# Patient Record
Sex: MALE | Race: WHITE | NOT HISPANIC OR LATINO
[De-identification: names, ages, dates, MRNs, and addresses within clinical notes are randomized per-mention and may not be internally consistent; named-entity substitution may affect disease eponyms.]

---

## 2017-01-09 ENCOUNTER — APPOINTMENT (OUTPATIENT)
Dept: OTOLARYNGOLOGY | Facility: AMBULATORY SURGERY CENTER | Age: 32
End: 2017-01-09

## 2017-02-21 ENCOUNTER — APPOINTMENT (OUTPATIENT)
Dept: OTOLARYNGOLOGY | Facility: CLINIC | Age: 32
End: 2017-02-21

## 2017-02-21 VITALS
WEIGHT: 220 LBS | TEMPERATURE: 98.1 F | SYSTOLIC BLOOD PRESSURE: 137 MMHG | HEIGHT: 74 IN | HEART RATE: 69 BPM | BODY MASS INDEX: 28.23 KG/M2 | DIASTOLIC BLOOD PRESSURE: 89 MMHG

## 2017-02-21 RX ORDER — AMOXICILLIN AND CLAVULANATE POTASSIUM 875; 125 MG/1; MG/1
875-125 TABLET, COATED ORAL
Qty: 28 | Refills: 1 | Status: ACTIVE | COMMUNITY
Start: 2017-02-21 | End: 1900-01-01

## 2017-02-21 RX ORDER — PREDNISONE 10 MG/1
10 TABLET ORAL
Qty: 28 | Refills: 1 | Status: ACTIVE | COMMUNITY
Start: 2017-02-21 | End: 1900-01-01

## 2017-02-21 RX ORDER — HYDROCODONE BITARTRATE AND ACETAMINOPHEN 5; 325 MG/1; MG/1
5-325 TABLET ORAL
Qty: 5 | Refills: 0 | Status: ACTIVE | COMMUNITY
Start: 2017-02-21 | End: 1900-01-01

## 2017-02-22 ENCOUNTER — FORM ENCOUNTER (OUTPATIENT)
Age: 32
End: 2017-02-22

## 2017-02-23 ENCOUNTER — OUTPATIENT (OUTPATIENT)
Dept: OUTPATIENT SERVICES | Facility: HOSPITAL | Age: 32
LOS: 1 days | End: 2017-02-23
Payer: COMMERCIAL

## 2017-02-23 PROCEDURE — 70486 CT MAXILLOFACIAL W/O DYE: CPT

## 2017-02-23 PROCEDURE — 70486 CT MAXILLOFACIAL W/O DYE: CPT | Mod: 26

## 2017-03-05 ENCOUNTER — RESULT REVIEW (OUTPATIENT)
Age: 32
End: 2017-03-05

## 2017-03-06 ENCOUNTER — APPOINTMENT (OUTPATIENT)
Dept: OTOLARYNGOLOGY | Facility: AMBULATORY SURGERY CENTER | Age: 32
End: 2017-03-06

## 2017-03-06 ENCOUNTER — OUTPATIENT (OUTPATIENT)
Dept: OUTPATIENT SERVICES | Facility: HOSPITAL | Age: 32
LOS: 1 days | Discharge: HOME | End: 2017-03-06
Payer: COMMERCIAL

## 2017-03-06 PROCEDURE — 61782 SCAN PROC CRANIAL EXTRA: CPT

## 2017-03-06 PROCEDURE — 31254 NSL/SINS NDSC W/PRTL ETHMDCT: CPT | Mod: RT

## 2017-03-06 PROCEDURE — 61600 RESECT/EXCISE CRANIAL LESION: CPT | Mod: 52

## 2017-03-08 LAB — SURGICAL PATHOLOGY STUDY: SIGNIFICANT CHANGE UP

## 2017-03-09 DIAGNOSIS — I10 ESSENTIAL (PRIMARY) HYPERTENSION: ICD-10-CM

## 2017-03-09 DIAGNOSIS — J45.909 UNSPECIFIED ASTHMA, UNCOMPLICATED: ICD-10-CM

## 2017-03-09 DIAGNOSIS — J34.89 OTHER SPECIFIED DISORDERS OF NOSE AND NASAL SINUSES: ICD-10-CM

## 2017-03-09 DIAGNOSIS — E78.5 HYPERLIPIDEMIA, UNSPECIFIED: ICD-10-CM

## 2017-03-09 DIAGNOSIS — J32.1 CHRONIC FRONTAL SINUSITIS: ICD-10-CM

## 2017-03-09 DIAGNOSIS — J32.2 CHRONIC ETHMOIDAL SINUSITIS: ICD-10-CM

## 2017-03-09 DIAGNOSIS — Z79.52 LONG TERM (CURRENT) USE OF SYSTEMIC STEROIDS: ICD-10-CM

## 2017-03-29 ENCOUNTER — APPOINTMENT (OUTPATIENT)
Dept: OTOLARYNGOLOGY | Facility: CLINIC | Age: 32
End: 2017-03-29

## 2017-03-29 VITALS
BODY MASS INDEX: 28.23 KG/M2 | DIASTOLIC BLOOD PRESSURE: 88 MMHG | TEMPERATURE: 98.2 F | SYSTOLIC BLOOD PRESSURE: 129 MMHG | HEIGHT: 74 IN | WEIGHT: 220 LBS | HEART RATE: 74 BPM

## 2017-03-29 RX ORDER — FLUTICASONE PROPIONATE 50 UG/1
50 SPRAY, METERED NASAL
Qty: 6 | Refills: 1 | Status: ACTIVE | COMMUNITY
Start: 2017-03-29 | End: 1900-01-01

## 2017-05-02 ENCOUNTER — APPOINTMENT (OUTPATIENT)
Dept: OTOLARYNGOLOGY | Facility: CLINIC | Age: 32
End: 2017-05-02

## 2017-05-02 VITALS
WEIGHT: 220 LBS | DIASTOLIC BLOOD PRESSURE: 84 MMHG | HEIGHT: 74 IN | SYSTOLIC BLOOD PRESSURE: 124 MMHG | BODY MASS INDEX: 28.23 KG/M2 | HEART RATE: 88 BPM

## 2017-05-02 RX ORDER — AZELASTINE HYDROCHLORIDE 137 UG/1
0.1 SPRAY, METERED NASAL
Qty: 30 | Refills: 0 | Status: ACTIVE | COMMUNITY
Start: 2016-12-23

## 2017-08-15 ENCOUNTER — APPOINTMENT (OUTPATIENT)
Dept: OTOLARYNGOLOGY | Facility: CLINIC | Age: 32
End: 2017-08-15
Payer: COMMERCIAL

## 2017-08-15 VITALS
WEIGHT: 220 LBS | SYSTOLIC BLOOD PRESSURE: 136 MMHG | TEMPERATURE: 98.7 F | BODY MASS INDEX: 28.23 KG/M2 | HEART RATE: 83 BPM | HEIGHT: 74 IN | DIASTOLIC BLOOD PRESSURE: 76 MMHG

## 2017-08-15 PROCEDURE — 99214 OFFICE O/P EST MOD 30 MIN: CPT | Mod: 25

## 2017-08-15 PROCEDURE — 31231 NASAL ENDOSCOPY DX: CPT

## 2017-08-15 RX ORDER — LEVOFLOXACIN 500 MG/1
500 TABLET, FILM COATED ORAL
Qty: 10 | Refills: 0 | Status: ACTIVE | COMMUNITY
Start: 2016-12-20

## 2017-08-15 RX ORDER — SERTRALINE HYDROCHLORIDE 100 MG/1
100 TABLET, FILM COATED ORAL
Qty: 45 | Refills: 0 | Status: ACTIVE | COMMUNITY
Start: 2016-08-30

## 2017-11-21 ENCOUNTER — APPOINTMENT (OUTPATIENT)
Dept: OTOLARYNGOLOGY | Facility: CLINIC | Age: 32
End: 2017-11-21
Payer: COMMERCIAL

## 2017-11-21 VITALS — HEIGHT: 76 IN | WEIGHT: 220 LBS | BODY MASS INDEX: 26.79 KG/M2

## 2017-11-21 PROCEDURE — 31231 NASAL ENDOSCOPY DX: CPT

## 2017-11-21 PROCEDURE — 99214 OFFICE O/P EST MOD 30 MIN: CPT | Mod: 25

## 2018-03-28 ENCOUNTER — APPOINTMENT (OUTPATIENT)
Dept: OTOLARYNGOLOGY | Facility: CLINIC | Age: 33
End: 2018-03-28
Payer: COMMERCIAL

## 2018-03-28 VITALS
WEIGHT: 225 LBS | HEIGHT: 76 IN | OXYGEN SATURATION: 99 % | BODY MASS INDEX: 27.4 KG/M2 | DIASTOLIC BLOOD PRESSURE: 80 MMHG | TEMPERATURE: 98.6 F | HEART RATE: 85 BPM | SYSTOLIC BLOOD PRESSURE: 138 MMHG

## 2018-03-28 PROCEDURE — 31231 NASAL ENDOSCOPY DX: CPT

## 2018-03-28 PROCEDURE — 99214 OFFICE O/P EST MOD 30 MIN: CPT | Mod: 25

## 2018-03-28 RX ORDER — FLUTICASONE PROPIONATE 50 UG/1
50 SPRAY, METERED NASAL
Qty: 6 | Refills: 1 | Status: ACTIVE | COMMUNITY
Start: 2018-03-28 | End: 1900-01-01

## 2018-06-24 ENCOUNTER — RX RENEWAL (OUTPATIENT)
Age: 33
End: 2018-06-24

## 2018-07-11 ENCOUNTER — APPOINTMENT (OUTPATIENT)
Dept: ENDOCRINOLOGY | Facility: CLINIC | Age: 33
End: 2018-07-11
Payer: COMMERCIAL

## 2018-07-11 VITALS
DIASTOLIC BLOOD PRESSURE: 86 MMHG | HEIGHT: 74 IN | WEIGHT: 205 LBS | BODY MASS INDEX: 26.31 KG/M2 | SYSTOLIC BLOOD PRESSURE: 126 MMHG | HEART RATE: 82 BPM

## 2018-07-11 PROCEDURE — 99204 OFFICE O/P NEW MOD 45 MIN: CPT

## 2018-07-11 RX ORDER — VERAPAMIL HYDROCHLORIDE 120 MG/1
120 TABLET ORAL
Qty: 30 | Refills: 0 | Status: ACTIVE | COMMUNITY
Start: 2018-02-13

## 2018-07-11 RX ORDER — CHORIONIC GONADOTROPIN 10000 UNIT
10000 KIT INTRAMUSCULAR
Refills: 0 | Status: ACTIVE | COMMUNITY
Start: 2018-07-11

## 2018-07-19 ENCOUNTER — FORM ENCOUNTER (OUTPATIENT)
Age: 33
End: 2018-07-19

## 2018-07-20 ENCOUNTER — APPOINTMENT (OUTPATIENT)
Dept: MRI IMAGING | Facility: CLINIC | Age: 33
End: 2018-07-20
Payer: COMMERCIAL

## 2018-07-20 ENCOUNTER — OUTPATIENT (OUTPATIENT)
Dept: OUTPATIENT SERVICES | Facility: HOSPITAL | Age: 33
LOS: 1 days | End: 2018-07-20

## 2018-07-20 LAB
FSH SERPL-MCNC: 3.1 IU/L
LH SERPL-ACNC: 1.9 IU/L
PROLACTIN SERPL-MCNC: 9.1 NG/ML
SHBG SERPL-SCNC: 22 NMOL/L
TESTOST BND SERPL-MCNC: 8 PG/ML
TESTOST SERPL-MCNC: 670 NG/DL

## 2018-07-20 PROCEDURE — 70553 MRI BRAIN STEM W/O & W/DYE: CPT | Mod: 26

## 2018-07-31 ENCOUNTER — RX RENEWAL (OUTPATIENT)
Age: 33
End: 2018-07-31

## 2018-07-31 ENCOUNTER — CHART COPY (OUTPATIENT)
Age: 33
End: 2018-07-31

## 2018-08-09 ENCOUNTER — RX RENEWAL (OUTPATIENT)
Age: 33
End: 2018-08-09

## 2018-09-29 ENCOUNTER — RX RENEWAL (OUTPATIENT)
Age: 33
End: 2018-09-29

## 2018-10-09 ENCOUNTER — RX RENEWAL (OUTPATIENT)
Age: 33
End: 2018-10-09

## 2018-10-16 ENCOUNTER — APPOINTMENT (OUTPATIENT)
Dept: OTOLARYNGOLOGY | Facility: CLINIC | Age: 33
End: 2018-10-16
Payer: COMMERCIAL

## 2018-10-16 VITALS
DIASTOLIC BLOOD PRESSURE: 87 MMHG | OXYGEN SATURATION: 97 % | TEMPERATURE: 98.6 F | WEIGHT: 205 LBS | HEIGHT: 74 IN | HEART RATE: 77 BPM | SYSTOLIC BLOOD PRESSURE: 136 MMHG | BODY MASS INDEX: 26.31 KG/M2

## 2018-10-16 PROCEDURE — 99215 OFFICE O/P EST HI 40 MIN: CPT | Mod: 25

## 2018-10-16 PROCEDURE — 31231 NASAL ENDOSCOPY DX: CPT

## 2018-10-16 RX ORDER — PREDNISONE 10 MG/1
10 TABLET ORAL
Qty: 40 | Refills: 1 | Status: ACTIVE | COMMUNITY
Start: 2018-10-16 | End: 1900-01-01

## 2018-10-16 RX ORDER — AMOXICILLIN AND CLAVULANATE POTASSIUM 875; 125 MG/1; MG/1
875-125 TABLET, COATED ORAL TWICE DAILY
Qty: 42 | Refills: 1 | Status: ACTIVE | COMMUNITY
Start: 2018-10-16 | End: 1900-01-01

## 2018-11-01 ENCOUNTER — RX RENEWAL (OUTPATIENT)
Age: 33
End: 2018-11-01

## 2018-11-01 LAB
FSH SERPL-MCNC: 3.9 IU/L
LH SERPL-ACNC: 5.9 IU/L
PROLACTIN SERPL-MCNC: 8.6 NG/ML
TESTOST BND SERPL-MCNC: 9.6 PG/ML
TESTOST SERPL-MCNC: 421.5 NG/DL

## 2018-11-12 ENCOUNTER — APPOINTMENT (OUTPATIENT)
Dept: ENDOCRINOLOGY | Facility: CLINIC | Age: 33
End: 2018-11-12
Payer: COMMERCIAL

## 2018-11-12 VITALS
SYSTOLIC BLOOD PRESSURE: 154 MMHG | WEIGHT: 200 LBS | DIASTOLIC BLOOD PRESSURE: 90 MMHG | HEIGHT: 74 IN | BODY MASS INDEX: 25.67 KG/M2 | HEART RATE: 91 BPM

## 2018-11-12 DIAGNOSIS — J32.2 CHRONIC ETHMOIDAL SINUSITIS: ICD-10-CM

## 2018-11-12 DIAGNOSIS — H92.20 OTORRHAGIA, UNSPECIFIED EAR: ICD-10-CM

## 2018-11-12 DIAGNOSIS — R04.0 EPISTAXIS: ICD-10-CM

## 2018-11-12 PROCEDURE — 99214 OFFICE O/P EST MOD 30 MIN: CPT

## 2018-12-04 ENCOUNTER — FORM ENCOUNTER (OUTPATIENT)
Age: 33
End: 2018-12-04

## 2018-12-05 ENCOUNTER — APPOINTMENT (OUTPATIENT)
Dept: CT IMAGING | Facility: CLINIC | Age: 33
End: 2018-12-05
Payer: COMMERCIAL

## 2018-12-05 ENCOUNTER — OUTPATIENT (OUTPATIENT)
Dept: OUTPATIENT SERVICES | Facility: HOSPITAL | Age: 33
LOS: 1 days | End: 2018-12-05
Payer: COMMERCIAL

## 2018-12-05 PROCEDURE — 70486 CT MAXILLOFACIAL W/O DYE: CPT | Mod: 26

## 2018-12-05 PROCEDURE — 70486 CT MAXILLOFACIAL W/O DYE: CPT

## 2018-12-11 ENCOUNTER — APPOINTMENT (OUTPATIENT)
Dept: OTOLARYNGOLOGY | Facility: CLINIC | Age: 33
End: 2018-12-11
Payer: COMMERCIAL

## 2018-12-20 ENCOUNTER — APPOINTMENT (OUTPATIENT)
Dept: OTOLARYNGOLOGY | Facility: CLINIC | Age: 33
End: 2018-12-20
Payer: COMMERCIAL

## 2018-12-20 VITALS
SYSTOLIC BLOOD PRESSURE: 146 MMHG | HEIGHT: 74 IN | BODY MASS INDEX: 25.67 KG/M2 | HEART RATE: 81 BPM | DIASTOLIC BLOOD PRESSURE: 89 MMHG | OXYGEN SATURATION: 98 % | WEIGHT: 200 LBS

## 2018-12-20 DIAGNOSIS — J32.0 CHRONIC MAXILLARY SINUSITIS: ICD-10-CM

## 2018-12-20 PROCEDURE — 99214 OFFICE O/P EST MOD 30 MIN: CPT | Mod: 25

## 2018-12-20 PROCEDURE — 31231 NASAL ENDOSCOPY DX: CPT

## 2019-01-05 ENCOUNTER — TRANSCRIPTION ENCOUNTER (OUTPATIENT)
Age: 34
End: 2019-01-05

## 2019-02-13 ENCOUNTER — LABORATORY RESULT (OUTPATIENT)
Age: 34
End: 2019-02-13

## 2019-03-18 ENCOUNTER — OTHER (OUTPATIENT)
Age: 34
End: 2019-03-18

## 2019-04-19 ENCOUNTER — RX RENEWAL (OUTPATIENT)
Age: 34
End: 2019-04-19

## 2019-05-16 ENCOUNTER — APPOINTMENT (OUTPATIENT)
Dept: ENDOCRINOLOGY | Facility: CLINIC | Age: 34
End: 2019-05-16
Payer: COMMERCIAL

## 2019-05-16 VITALS
DIASTOLIC BLOOD PRESSURE: 82 MMHG | SYSTOLIC BLOOD PRESSURE: 136 MMHG | WEIGHT: 199 LBS | BODY MASS INDEX: 25.54 KG/M2 | HEART RATE: 77 BPM | HEIGHT: 74 IN

## 2019-05-16 DIAGNOSIS — L65.9 NONSCARRING HAIR LOSS, UNSPECIFIED: ICD-10-CM

## 2019-05-16 DIAGNOSIS — Z00.00 ENCOUNTER FOR GENERAL ADULT MEDICAL EXAMINATION W/OUT ABNORMAL FINDINGS: ICD-10-CM

## 2019-05-16 PROCEDURE — 99214 OFFICE O/P EST MOD 30 MIN: CPT

## 2019-05-16 NOTE — HISTORY OF PRESENT ILLNESS
[FreeTextEntry1] : 32 y.o. male referred for evaluation of low testosterone found incidentally during an evaluation for fatigue.\par Total T was 149 ng/dl; free T was 6.0 pg/ml. PRL was 16. TFTs were normal. LH and FSH were not obtained.\par The patient reports good libido and good erectile function. He plans to have children at some point in the future.\par 11/12/18. The patient is feeling well. He is on treatment with Clomid. Has lost 5 lb. Testosterone levels improved, but fatigue persists. He is planning to have children in 2-3 yrs.\par Pituitary MRI was normal.\par 5/16/19. The patient is doing well except his fatigue persists. He discontinued Clomid 3 months ago.\par Is planning to get  in 5 months. His weight is stable.

## 2019-05-16 NOTE — PHYSICAL EXAM
[Alert] : alert [Well Nourished] : well nourished [No Acute Distress] : no acute distress [Well Developed] : well developed [Normal Sclera/Conjunctiva] : normal sclera/conjunctiva [EOMI] : extra ocular movement intact [No Proptosis] : no proptosis [Normal Oropharynx] : the oropharynx was normal [No Thyroid Nodules] : there were no palpable thyroid nodules [Thyroid Not Enlarged] : the thyroid was not enlarged [No Respiratory Distress] : no respiratory distress [No Accessory Muscle Use] : no accessory muscle use [Clear to Auscultation] : lungs were clear to auscultation bilaterally [Normal Rate] : heart rate was normal  [Normal S1, S2] : normal S1 and S2 [Pedal Pulses Normal] : the pedal pulses are present [Regular Rhythm] : with a regular rhythm [No Edema] : there was no peripheral edema [Normal Bowel Sounds] : normal bowel sounds [Not Tender] : non-tender [Soft] : abdomen soft [Not Distended] : not distended [Post Cervical Nodes] : posterior cervical nodes [Anterior Cervical Nodes] : anterior cervical nodes [Axillary Nodes] : axillary nodes [No Spinal Tenderness] : no spinal tenderness [Normal] : normal and non tender [Spine Straight] : spine straight [No Stigmata of Cushings Syndrome] : no stigmata of cushings syndrome [Normal Gait] : normal gait [Normal Strength/Tone] : muscle strength and tone were normal [Acanthosis Nigricans] : no acanthosis nigricans [No Rash] : no rash [Normal Reflexes] : deep tendon reflexes were 2+ and symmetric [No Tremors] : no tremors [Oriented x3] : oriented to person, place, and time

## 2019-05-24 LAB
ALBUMIN SERPL ELPH-MCNC: 4.9 G/DL
ALP BLD-CCNC: 70 U/L
ALT SERPL-CCNC: 22 U/L
ANION GAP SERPL CALC-SCNC: 10 MMOL/L
AST SERPL-CCNC: 23 U/L
BASOPHILS # BLD AUTO: 0.02 K/UL
BASOPHILS NFR BLD AUTO: 0.4 %
BILIRUB SERPL-MCNC: 0.7 MG/DL
BUN SERPL-MCNC: 22 MG/DL
CALCIUM SERPL-MCNC: 10.2 MG/DL
CHLORIDE SERPL-SCNC: 100 MMOL/L
CHOLEST SERPL-MCNC: 141 MG/DL
CHOLEST/HDLC SERPL: 3.3 RATIO
CO2 SERPL-SCNC: 31 MMOL/L
CREAT SERPL-MCNC: 1.25 MG/DL
EOSINOPHIL # BLD AUTO: 0.16 K/UL
EOSINOPHIL NFR BLD AUTO: 2.8 %
ESTIMATED AVERAGE GLUCOSE: 97 MG/DL
FSH SERPL-MCNC: 2.7 IU/L
GLUCOSE SERPL-MCNC: 86 MG/DL
HBA1C MFR BLD HPLC: 5 %
HCT VFR BLD CALC: 46 %
HDLC SERPL-MCNC: 43 MG/DL
HGB BLD-MCNC: 15.6 G/DL
IMM GRANULOCYTES NFR BLD AUTO: 0.2 %
LDLC SERPL CALC-MCNC: 73 MG/DL
LH SERPL-ACNC: 2.7 IU/L
LYMPHOCYTES # BLD AUTO: 1.84 K/UL
LYMPHOCYTES NFR BLD AUTO: 32.5 %
MAN DIFF?: NORMAL
MCHC RBC-ENTMCNC: 32.2 PG
MCHC RBC-ENTMCNC: 33.9 GM/DL
MCV RBC AUTO: 95 FL
MONOCYTES # BLD AUTO: 0.54 K/UL
MONOCYTES NFR BLD AUTO: 9.5 %
NEUTROPHILS # BLD AUTO: 3.1 K/UL
NEUTROPHILS NFR BLD AUTO: 54.6 %
PLATELET # BLD AUTO: 236 K/UL
POTASSIUM SERPL-SCNC: 4.9 MMOL/L
PROLACTIN SERPL-MCNC: 9.4 NG/ML
PROT SERPL-MCNC: 7.2 G/DL
RBC # BLD: 4.84 M/UL
RBC # FLD: 12.7 %
SODIUM SERPL-SCNC: 141 MMOL/L
T3 SERPL-MCNC: 81 NG/DL
T4 FREE SERPL-MCNC: 1.5 NG/DL
TESTOST BND SERPL-MCNC: 6.7 PG/ML
TESTOST SERPL-MCNC: 262.1 NG/DL
TRIGL SERPL-MCNC: 123 MG/DL
TSH SERPL-ACNC: 1.31 UIU/ML
WBC # FLD AUTO: 5.67 K/UL

## 2021-06-28 ENCOUNTER — APPOINTMENT (OUTPATIENT)
Dept: ENDOCRINOLOGY | Facility: CLINIC | Age: 36
End: 2021-06-28
Payer: COMMERCIAL

## 2021-06-28 VITALS
DIASTOLIC BLOOD PRESSURE: 97 MMHG | BODY MASS INDEX: 25.94 KG/M2 | SYSTOLIC BLOOD PRESSURE: 150 MMHG | WEIGHT: 202 LBS | HEART RATE: 94 BPM

## 2021-06-28 PROCEDURE — 99072 ADDL SUPL MATRL&STAF TM PHE: CPT

## 2021-06-28 PROCEDURE — 99214 OFFICE O/P EST MOD 30 MIN: CPT

## 2021-06-28 RX ORDER — CHORIONIC GONADOTROPIN 10000 UNIT
10000 KIT INTRAMUSCULAR
Refills: 0 | Status: ACTIVE | COMMUNITY
Start: 2021-06-28

## 2021-06-28 NOTE — ASSESSMENT
[FreeTextEntry1] : Hypogonadism.\par Low GH level.\par Will repeat GH and obtain IGF1 level.\par F/u once the above results are received.

## 2021-06-28 NOTE — HISTORY OF PRESENT ILLNESS
[FreeTextEntry1] : 32 y.o. male referred for evaluation of low testosterone found incidentally during an evaluation for fatigue.\par Total T was 149 ng/dl; free T was 6.0 pg/ml. PRL was 16. TFTs were normal. LH and FSH were not obtained.\par The patient reports good libido and good erectile function. He plans to have children at some point in the future.\par 11/12/18. The patient is feeling well. He is on treatment with Clomid. Has lost 5 lb. Testosterone levels improved, but fatigue persists. He is planning to have children in 2-3 yrs.\par Pituitary MRI was normal.\par 5/16/19. The patient is doing well except his fatigue persists. He discontinued Clomid 3 months ago.\par Is planning to get  in 5 months. His weight is stable.\par 6/28/21. The patient is doing well overall except for persisting fatigue.\par He had a baby born 5 weeks ago.\par PMD obtained a GH level which was undetectable.\par MRI of the pituitary was normal in 7/20/2018.\par He is wondering if GH tx is indicated.

## 2021-06-30 LAB
GH SERPL-MCNC: <0.03 NG/ML
IGF-1 INTERP: NORMAL
IGF-I BLD-MCNC: 247 NG/ML

## 2021-12-14 ENCOUNTER — APPOINTMENT (OUTPATIENT)
Dept: ENDOCRINOLOGY | Facility: CLINIC | Age: 36
End: 2021-12-14
Payer: COMMERCIAL

## 2021-12-14 PROCEDURE — 99214 OFFICE O/P EST MOD 30 MIN: CPT | Mod: 95

## 2021-12-14 NOTE — ASSESSMENT
[FreeTextEntry1] : Hypogonadism.\par \par Will repeat lab. tests.\par Will provide information re HCG injections.\par F/U once the results of the above tests are available.

## 2021-12-14 NOTE — HISTORY OF PRESENT ILLNESS
[FreeTextEntry1] : 32 y.o. male referred for evaluation of low testosterone found incidentally during an evaluation for fatigue.\par Total T was 149 ng/dl; free T was 6.0 pg/ml. PRL was 16. TFTs were normal. LH and FSH were not obtained.\par The patient reports good libido and good erectile function. He plans to have children at some point in the future.\par 11/12/18. The patient is feeling well. He is on treatment with Clomid. Has lost 5 lb. Testosterone levels improved, but fatigue persists. He is planning to have children in 2-3 yrs.\par Pituitary MRI was normal.\par 5/16/19. The patient is doing well except his fatigue persists. He discontinued Clomid 3 months ago.\par Is planning to get  in 5 months. His weight is stable.\par 6/28/21. The patient is doing well overall except for persisting fatigue.\par He had a baby born 5 weeks ago.\par PMD obtained a GH level which was undetectable.\par MRI of the pituitary was normal in 7/20/2018.\par He is wondering if GH tx is indicated.\par 12/14/21. The patient's condition is unchanged.\par He continues to c/o fatigue and headaches but otherwise feels well.\par He has a 7 months old baby and is definitely planning more children.

## 2021-12-14 NOTE — REVIEW OF SYSTEMS
[As Noted in HPI] : as noted in HPI [Fatigue] : fatigue [Negative] : Heme/Lymph [FreeTextEntry2] : headaches

## 2021-12-14 NOTE — REASON FOR VISIT
[Home] : at home, [unfilled] , at the time of the visit. [Medical Office: ([facility])___] : at the medical office located in  [Verbal consent obtained from patient] : the patient, [unfilled] [Follow - Up] : a follow-up visit [Hypogonadism] : hypogonadism

## 2021-12-15 RX ORDER — CHORIONIC GONADOTROPIN 10000 UNIT
10000 KIT INTRAMUSCULAR
Refills: 0 | Status: ACTIVE | COMMUNITY
Start: 2021-12-15

## 2022-01-06 ENCOUNTER — NON-APPOINTMENT (OUTPATIENT)
Age: 37
End: 2022-01-06

## 2022-01-06 LAB
25(OH)D3 SERPL-MCNC: 33.8 NG/ML
ALBUMIN SERPL ELPH-MCNC: 5.2 G/DL
ALP BLD-CCNC: 80 U/L
ALT SERPL-CCNC: 38 U/L
ANION GAP SERPL CALC-SCNC: 14 MMOL/L
AST SERPL-CCNC: 26 U/L
BASOPHILS # BLD AUTO: 0.01 K/UL
BASOPHILS NFR BLD AUTO: 0.1 %
BILIRUB SERPL-MCNC: 0.6 MG/DL
BUN SERPL-MCNC: 16 MG/DL
CALCIUM SERPL-MCNC: 10.2 MG/DL
CHLORIDE SERPL-SCNC: 99 MMOL/L
CHOLEST SERPL-MCNC: 190 MG/DL
CO2 SERPL-SCNC: 27 MMOL/L
CREAT SERPL-MCNC: 1.17 MG/DL
EOSINOPHIL # BLD AUTO: 0 K/UL
EOSINOPHIL NFR BLD AUTO: 0 %
ESTIMATED AVERAGE GLUCOSE: 97 MG/DL
FSH SERPL-MCNC: 4.9 IU/L
GH SERPL-MCNC: 0.21 NG/ML
GLUCOSE SERPL-MCNC: 93 MG/DL
HBA1C MFR BLD HPLC: 5 %
HCT VFR BLD CALC: 48.4 %
HDLC SERPL-MCNC: 55 MG/DL
HGB BLD-MCNC: 16.4 G/DL
IGF-1 INTERP: NORMAL
IGF-I BLD-MCNC: 256 NG/ML
IMM GRANULOCYTES NFR BLD AUTO: 0.3 %
LDLC SERPL CALC-MCNC: 118 MG/DL
LH SERPL-ACNC: 10.8 IU/L
LYMPHOCYTES # BLD AUTO: 1.52 K/UL
LYMPHOCYTES NFR BLD AUTO: 12.9 %
MAN DIFF?: NORMAL
MCHC RBC-ENTMCNC: 32.6 PG
MCHC RBC-ENTMCNC: 33.9 GM/DL
MCV RBC AUTO: 96.2 FL
MONOCYTES # BLD AUTO: 0.92 K/UL
MONOCYTES NFR BLD AUTO: 7.8 %
NEUTROPHILS # BLD AUTO: 9.27 K/UL
NEUTROPHILS NFR BLD AUTO: 78.9 %
NONHDLC SERPL-MCNC: 135 MG/DL
PLATELET # BLD AUTO: 261 K/UL
POTASSIUM SERPL-SCNC: 4.8 MMOL/L
PROLACTIN SERPL-MCNC: 11.1 NG/ML
PROT SERPL-MCNC: 7.8 G/DL
RBC # BLD: 5.03 M/UL
RBC # FLD: 13.2 %
SODIUM SERPL-SCNC: 140 MMOL/L
T3 SERPL-MCNC: 73 NG/DL
T4 FREE SERPL-MCNC: 1.3 NG/DL
TESTOST BND SERPL-MCNC: 5.1 PG/ML
TESTOSTERONE TOTAL S: 197 NG/DL
TRIGL SERPL-MCNC: 87 MG/DL
TSH SERPL-ACNC: 0.59 UIU/ML
WBC # FLD AUTO: 11.75 K/UL

## 2022-02-17 ENCOUNTER — APPOINTMENT (OUTPATIENT)
Dept: ENDOCRINOLOGY | Facility: CLINIC | Age: 37
End: 2022-02-17
Payer: COMMERCIAL

## 2022-02-17 ENCOUNTER — LABORATORY RESULT (OUTPATIENT)
Age: 37
End: 2022-02-17

## 2022-02-17 PROCEDURE — 99212 OFFICE O/P EST SF 10 MIN: CPT | Mod: 25

## 2022-02-17 NOTE — PHYSICAL EXAM
[Alert] : alert [Well Nourished] : well nourished [Healthy Appearance] : healthy appearance [No Acute Distress] : no acute distress [No Stigmata of Cushings Syndrome] : no stigmata of Cushings Syndrome [Normal Gait] : normal gait [No Clubbing, Cyanosis] : no clubbing  or cyanosis of the fingernails [No Involuntary Movements] : no involuntary movements were seen [Oriented x3] : oriented to person, place, and time

## 2022-02-17 NOTE — ASSESSMENT
[FreeTextEntry1] : Blood is drawn for a baseline serum hGH test, gluc-se and IGF-1.\par A drink containing 75 grams of glucose was administered.\par Samples for analysis are collected for serum hGH and glucose every 30 minutes for two hours at 30-, 60-, 90-, and 120-minute intervals after the glucose drink is ingested.\par Tolerated well.\par F/U with Dr. Cardenas once the results are available.

## 2022-02-24 ENCOUNTER — NON-APPOINTMENT (OUTPATIENT)
Age: 37
End: 2022-02-24

## 2022-03-03 LAB
GH SERPL-ACNC: NORMAL
GH SERPL-MCNC: 0.07 NG/ML
GH SERPL-MCNC: <0.03 NG/ML
GLUCOSE BS SERPL-MCNC: 90 MG/DL
IGF-1 INTERP: NORMAL
IGF-I BLD-MCNC: 170 NG/ML

## 2022-05-03 ENCOUNTER — APPOINTMENT (OUTPATIENT)
Dept: ENDOCRINOLOGY | Facility: CLINIC | Age: 37
End: 2022-05-03
Payer: COMMERCIAL

## 2022-05-03 VITALS
WEIGHT: 202 LBS | HEART RATE: 77 BPM | SYSTOLIC BLOOD PRESSURE: 153 MMHG | DIASTOLIC BLOOD PRESSURE: 95 MMHG | BODY MASS INDEX: 25.94 KG/M2

## 2022-05-03 PROCEDURE — 99214 OFFICE O/P EST MOD 30 MIN: CPT

## 2022-05-03 NOTE — ASSESSMENT
[FreeTextEntry1] : Hypogonadism.\par Will initiate clomiphene 50 mg 3 times /week.\par Repeat T level in 1 month.\par F/U once the above is completed.

## 2022-05-03 NOTE — HISTORY OF PRESENT ILLNESS
[FreeTextEntry1] : 32 y.o. male referred for evaluation of low testosterone found incidentally during an evaluation for fatigue.\par Total T was 149 ng/dl; free T was 6.0 pg/ml. PRL was 16. TFTs were normal. LH and FSH were not obtained.\par The patient reports good libido and good erectile function. He plans to have children at some point in the future.\par 11/12/18. The patient is feeling well. He is on treatment with Clomid. Has lost 5 lb. Testosterone levels improved, but fatigue persists. He is planning to have children in 2-3 yrs.\par Pituitary MRI was normal.\par 5/16/19. The patient is doing well except his fatigue persists. He discontinued Clomid 3 months ago.\par Is planning to get  in 5 months. His weight is stable.\par 6/28/21. The patient is doing well overall except for persisting fatigue.\par He had a baby born 5 weeks ago.\par PMD obtained a GH level which was undetectable.\par MRI of the pituitary was normal in 7/20/2018.\par He is wondering if GH tx is indicated.\par 12/14/21. The patient's condition is unchanged.\par He continues to c/o fatigue and headaches but otherwise feels well.\par He has a 7 months old baby and is definitely planning more children.\par 5/3/22. The patient continues to c/o fatigue.\par He is not on any tx for hypogonadism at this time.\par OGTT test was normal.\par He and his wife are planning more children.

## 2022-05-10 RX ORDER — CLOMIPHENE CITRATE 50 MG/1
50 TABLET ORAL EVERY OTHER DAY
Qty: 45 | Refills: 2 | Status: ACTIVE | COMMUNITY
Start: 2018-07-11 | End: 1900-01-01

## 2022-05-16 LAB
TESTOST FREE SERPL-MCNC: 11.7 PG/ML
TESTOST SERPL-MCNC: 604 NG/DL

## 2022-05-26 ENCOUNTER — APPOINTMENT (OUTPATIENT)
Dept: OTOLARYNGOLOGY | Facility: CLINIC | Age: 37
End: 2022-05-26
Payer: COMMERCIAL

## 2022-05-26 VITALS
RESPIRATION RATE: 74 BRPM | TEMPERATURE: 96.7 F | DIASTOLIC BLOOD PRESSURE: 98 MMHG | WEIGHT: 200 LBS | SYSTOLIC BLOOD PRESSURE: 144 MMHG | HEIGHT: 74 IN | BODY MASS INDEX: 25.67 KG/M2

## 2022-05-26 DIAGNOSIS — J32.3 CHRONIC SPHENOIDAL SINUSITIS: ICD-10-CM

## 2022-05-26 DIAGNOSIS — J32.1 CHRONIC FRONTAL SINUSITIS: ICD-10-CM

## 2022-05-26 PROCEDURE — 31231 NASAL ENDOSCOPY DX: CPT

## 2022-05-26 PROCEDURE — 99204 OFFICE O/P NEW MOD 45 MIN: CPT | Mod: 25

## 2022-05-26 PROCEDURE — 99202 OFFICE O/P NEW SF 15 MIN: CPT | Mod: 25

## 2022-05-26 RX ORDER — AMANTADINE HYDROCHLORIDE 100 MG/1
100 CAPSULE ORAL
Refills: 0 | Status: ACTIVE | COMMUNITY
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
## 2022-05-26 NOTE — CONSULT LETTER
[Dear  ___] : Dear  [unfilled], [Courtesy Letter:] : I had the pleasure of seeing your patient, [unfilled], in my office today. [Consult Closing:] : Thank you very much for allowing me to participate in the care of this patient.  If you have any questions, please do not hesitate to contact me. [Sincerely,] : Sincerely, [FreeTextEntry3] : Regan Kaiser MD\par Department of Otolaryngology - Head and Neck Surgery\par [facility]

## 2022-05-26 NOTE — ASSESSMENT
[FreeTextEntry1] : 36M here for initial evaluation. Over the past 6 months, pt reports recurrent episodes of dark yellow nasal drainage, postnasal drip with sore throat and ear pressure. Each time he has gone on abx which may give temporary improvement only for his sx to recur shortly thereafter. He has also completed one course steroids (medrol chago) during this time. He has long standing h/o chronic pansinusitis and has underwent multiple sinus surgeries in past, most recently in 2017, for recurrent infections with identical sx as what he has been experiencing these past 6 months. He has now been off abx for 2 months or so and has sx now. There is no asthma, no eczema, no allergies, no ASA/NSAID sensitivity. He remains on daily budesonide rinses. Most recent imaging is from 4 years ago, as above. On exam, nasal endoscopy shows narrow nasal airways with contracted ethmoids and diffuse polypoid sinonasal mucosal edema and thin mucus from the right frontal outflow which was sent for cx; both nasal airways are otherwise patent.\par Despite fact endoscopy does not look awful, his history mostly speaks for itself, and he likely has persistent acute on chronic pansinusitis. He has h/o extensive sinus surgery (but not complete surgery) in addition to right sided anatomic variants as well. For now, will get repeat imaging given length of time of persistent complaints. Increase to BID budesonide rinsing. Will f/u new cx and then likely start cx-directed abx with extended steroid course, the latter of which will most likely give most relief. Will speak to pt after CT done in the next few days.

## 2022-05-26 NOTE — PROCEDURE
[Dear  ___] : Dear  [unfilled], [Consult Letter:] : I had the pleasure of evaluating your patient, [unfilled]. [( Thank you for referring [unfilled] for consultation for _____ )] : Thank you for referring [unfilled] for consultation for [unfilled] [Please see my note below.] : Please see my note below. [Consult Closing:] : Thank you very much for allowing me to participate in the care of this patient.  If you have any questions, please do not hesitate to contact me. [Sincerely,] : Sincerely, [FreeTextEntry2] : Dr. Ronny Starkey (Card) [FreeTextEntry3] : Abdoul Valenzuela MD \par Attending Surgeon \par Division of Thoracic Surgery \par , Cardiovascular and Thoracic Surgery \par [facility]\par \par  [FreeTextEntry3] : Nasal Endoscopy:\par narrow nasal airways\par contracted ethmoids\par diffuse polypoid sinonasal mucosal edema- thin mucus right frontal outflow? sent for cx\par nasal airways otherwise patent, no mucopus or polyps\par

## 2022-05-27 ENCOUNTER — TRANSCRIPTION ENCOUNTER (OUTPATIENT)
Age: 37
End: 2022-05-27

## 2022-05-27 RX ORDER — PREDNISONE 10 MG/1
10 TABLET ORAL
Qty: 38 | Refills: 0 | Status: ACTIVE | COMMUNITY
Start: 2022-05-27 | End: 1900-01-01

## 2022-05-27 RX ORDER — SULFAMETHOXAZOLE AND TRIMETHOPRIM 800; 160 MG/1; MG/1
800-160 TABLET ORAL TWICE DAILY
Qty: 28 | Refills: 0 | Status: ACTIVE | COMMUNITY
Start: 2022-05-27 | End: 1900-01-01

## 2022-05-27 RX ORDER — GUAIFENESIN AND PSEUDOEPHEDRINE HYDROCHLORIDE 600; 60 MG/1; MG/1
60-600 TABLET, EXTENDED RELEASE ORAL
Qty: 60 | Refills: 0 | Status: ACTIVE | COMMUNITY
Start: 2022-05-27 | End: 1900-01-01

## 2022-05-31 LAB — EAR NOSE AND THROAT CULTURE: NORMAL

## 2022-06-23 LAB
TESTOST FREE SERPL-MCNC: 13.8 PG/ML
TESTOST SERPL-MCNC: 555 NG/DL

## 2022-06-24 ENCOUNTER — NON-APPOINTMENT (OUTPATIENT)
Age: 37
End: 2022-06-24

## 2022-06-27 ENCOUNTER — NON-APPOINTMENT (OUTPATIENT)
Age: 37
End: 2022-06-27

## 2022-07-11 ENCOUNTER — APPOINTMENT (OUTPATIENT)
Dept: ENDOCRINOLOGY | Facility: CLINIC | Age: 37
End: 2022-07-11

## 2022-07-22 NOTE — HISTORY OF PRESENT ILLNESS
[FreeTextEntry1] : Pt came for OGTT GH suppression test.\par Feels fine.\par Fasting prior to the test.\par No new complains.
Ambulance
No

## 2022-11-22 ENCOUNTER — APPOINTMENT (OUTPATIENT)
Dept: OTOLARYNGOLOGY | Facility: CLINIC | Age: 37
End: 2022-11-22
Payer: COMMERCIAL

## 2022-11-22 DIAGNOSIS — J34.89 OTHER SPECIFIED DISORDERS OF NOSE AND NASAL SINUSES: ICD-10-CM

## 2022-11-22 PROCEDURE — 99213 OFFICE O/P EST LOW 20 MIN: CPT | Mod: 25

## 2022-11-22 PROCEDURE — 31231 NASAL ENDOSCOPY DX: CPT | Mod: 52

## 2022-11-22 RX ORDER — BUDESONIDE 0.5 MG/2ML
0.5 INHALANT ORAL TWICE DAILY
Qty: 180 | Refills: 0 | Status: ACTIVE | COMMUNITY
Start: 2017-08-15 | End: 1900-01-01

## 2022-11-22 NOTE — CONSULT LETTER
[Dear  ___] : Dear  [unfilled], [Courtesy Letter:] : I had the pleasure of seeing your patient, [unfilled], in my office today. [Consult Closing:] : Thank you very much for allowing me to participate in the care of this patient.  If you have any questions, please do not hesitate to contact me. [Sincerely,] : Sincerely, [FreeTextEntry3] : Regan Kaiser MD\par Department of Otolaryngology - Head and Neck Surgery\par [facility]

## 2022-11-22 NOTE — PROCEDURE
[FreeTextEntry3] : Nasal Endoscopy:\par narrow nasal airways\par turbinate hypertrophy\par bilateral septal deviation\par contracted ethmoids\par diffuse polypoid sinonasal mucosal edema; mild crusting right ethmoid, no gross mucopus\par nasal airways otherwise patent, no mucopus or polyps\par

## 2022-11-22 NOTE — ASSESSMENT
[FreeTextEntry1] : 37M here in followup. Since last seen 6 months ago, he mostly has the same recurrent sx of green/yellow mucus, nasal congestion and ear pressure. During this time he has been on two courses of abx and one course steroids with temporary improvement.\par Over the past year or so, pt reports recurrent episodes of dark yellow nasal drainage, postnasal drip with sore throat and ear pressure. Each time he has gone on abx (+/-steroids) which may give temporary improvement only for his sx to recur shortly thereafter. He has long standing h/o chronic pansinusitis and has underwent multiple sinus surgeries in past, most recently in 2017, for recurrent infections with identical sx as what he has been experiencing these past 12 months. Most recent imaging shows e/o prior ESS w multifocal pansinus disease with residual left>right frontoethmoid partitions and variant right ethmoid anatomy. \par On exam, nasal endoscopy shows narrow nasal airways with turbinate hypertrophy and septal deviation with contracted ethmoids and diffuse polypoid sinonasal mucosal edema. There is crusting in the right ethmoid bed but no gross mucopus.\par Despite fact endoscopy does not look awful, his history again speaks for itself, and he persistent acute on chronic pansinusitis. He has h/o extensive sinus surgery (but not complete surgery given residual frontoethmoid partitions, septal deviation and turbinate hypertrophy) in addition to right sided anatomic variants as well. For now, will repeat CT sinus here given length of time of persistent complaints despite more abx/steroids and BID budesonide rinses and RTO for review and formulation of plan, which likely will entail revision, complete sinus surgery.
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
## 2022-12-23 ENCOUNTER — APPOINTMENT (OUTPATIENT)
Dept: OTOLARYNGOLOGY | Facility: CLINIC | Age: 37
End: 2022-12-23
Payer: COMMERCIAL

## 2022-12-23 PROCEDURE — 31231 NASAL ENDOSCOPY DX: CPT | Mod: 52

## 2022-12-23 PROCEDURE — 99214 OFFICE O/P EST MOD 30 MIN: CPT | Mod: 25
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
## 2022-12-23 NOTE — DATA REVIEWED
[de-identified] : CT 12/12/22:\par FINDINGS:\par \par Since prior study this patient is status post bilateral antrostomy and middle turbinectomy. The maxillary outflow is patent. The upper aspect of the nasal septum is deviated to the right. The anterior tip of the nasal septum is deviated to the left with inferior turbinate hypertrophy narrowing the nasal passages.\par \par There is a small polyp or retention cyst in the right maxillary sinus. The remainder the maxillary sinuses are clear.\par \par The residual ethmoid air cells, frontoethmoidal recesses and frontal sinus are clear.\par \par The sphenoid sinus and sphenoethmoidal recesses are clear.\par \par There is no posterior pharyngeal mass. The lamina papyracea, eulalia caity and cribriform plate are normal.\par \par The osteomeatal units are patent. The nasal septum is midline. The visualized soft tissues of the nasopharynx, orbits and brain are unremarkable. The mastoid air cells are normally aerated.\par \par IMPRESSION:\par \par 1. There is deviation of the upper nasal septum to the right in the anterior tip of the nasal septum to the left with inferior turbinate hypertrophy narrowing the nasal passages.\par \par 2. Interval bilateral antrostomy and middle turbinectomy with patent maxillary outflow. Small polyp or retention cyst right maxillary sinus. Previous extensive opacification right maxillary sinus is no longer present.\par \par 3. The remainder the paranasal sinuses and recesses are clear.

## 2022-12-23 NOTE — PROCEDURE
[FreeTextEntry3] : Nasal Endoscopy:\par narrow nasal airways; marked left anterior septal deflection\par turbinate hypertrophy\par bilateral septal deviation\par contracted ethmoids\par diffuse polypoid sinonasal mucosal edema; some mucoid debris left ethmoid/nasal cavity\par right frontal outflow patent

## 2022-12-23 NOTE — ASSESSMENT
[FreeTextEntry1] : 37M here in followup. Since last seen he is mostly status quo and cx remain unchanged. He is here to review CT images. He has the same recurrent sx of green/dark yellow and bloody mucus, postnasal drip, nasal congestion/obstruction, sore throat and ear pressure. He usually goes on abx/steroids each time which give temporary improvement only for sx to return thereafter. These are now occurring around 5 times per year. Abx usually are bactrim or augmentin.\par He has long standing h/o chronic pansinusitis and has underwent multiple sinus surgeries in past, most recently in 2017, for recurrent infections with identical sx as what he has been experiencing these past 2 years or so. There is no asthma, no eczema, no allergies, no ASA/NSAID sensitivity. He remains on daily budesonide rinses. Most recent imaging shows e/o prior limited ESS w patent paranasal sinuses, multiple residual left frontoethmoid partitions and variant right ethmoid anatomy; there is left midseptal deviation and right posterior septal deflection with turbinate hypertrophy.\par On exam, nasal endoscopy shows narrow nasal airways with turbinate hypertrophy and left>right septal deviation with polypoid sinonasal mucosal edema and some mucoid debris in the left OMC. \par He has recurrent episodes of acute (on chronic?) pansinusitis and is on abx/steroids upwards of 5 times per year for sx c/w sinusitis, as above. Though CT does not show much active inflammatory change, this was done in between infections, in addition to fact he has not had complete sinus surgery, as there are lots of residual left frontoethmoid partitions, right frontal outflow partitions in addition to septal deviation and turbinate hypertrophy, all despite his h/o multiple sinus surgeries. For now, it is not unreasonable to offer a revision sinus surgery - though this would be complete sinus surgery this time, in addition to septoplasty and turbinate reduction. The goal of surgery would be twofold - one, to improve nasal breathing and patency and two, to decrease frequency and severity of infection. This was discussed at length and all questions answered. He will speak about this w his PCP and keep me posted on how he wants to proceed.

## 2022-12-23 NOTE — CONSULT LETTER
[Dear  ___] : Dear  [unfilled], [Courtesy Letter:] : I had the pleasure of seeing your patient, [unfilled], in my office today. [Consult Closing:] : Thank you very much for allowing me to participate in the care of this patient.  If you have any questions, please do not hesitate to contact me. [Sincerely,] : Sincerely, [FreeTextEntry3] : Regan Kaiser MD\par Department of Otolaryngology - Head and Neck Surgery\par [facility]

## 2023-01-30 ENCOUNTER — APPOINTMENT (OUTPATIENT)
Dept: OTOLARYNGOLOGY | Facility: CLINIC | Age: 38
End: 2023-01-30
Payer: COMMERCIAL

## 2023-01-30 ENCOUNTER — LABORATORY RESULT (OUTPATIENT)
Age: 38
End: 2023-01-30

## 2023-01-30 VITALS
HEIGHT: 74 IN | OXYGEN SATURATION: 99 % | WEIGHT: 210 LBS | RESPIRATION RATE: 16 BRPM | HEART RATE: 85 BPM | TEMPERATURE: 97.7 F | BODY MASS INDEX: 26.95 KG/M2 | DIASTOLIC BLOOD PRESSURE: 86 MMHG | SYSTOLIC BLOOD PRESSURE: 146 MMHG

## 2023-01-30 DIAGNOSIS — J01.21 ACUTE RECURRENT ETHMOIDAL SINUSITIS: ICD-10-CM

## 2023-01-30 PROCEDURE — 99213 OFFICE O/P EST LOW 20 MIN: CPT | Mod: 25

## 2023-01-30 PROCEDURE — 31231 NASAL ENDOSCOPY DX: CPT | Mod: 52

## 2023-01-30 NOTE — ASSESSMENT
[FreeTextEntry1] : pt gets recurrent sinus infx requiring abx and steroids 4-6 x/yr but clears in between\par there are remaining ethmoid air cells\par nose cultures sent - I didn’t see in chart\par if cultures not informative, I agree with plan to straighten septum and remove remaining ethmoid bone to try to afford drainage and prevent future infxs or at least decrease frequency, as this is what could be getting infected; I also agree with opening any blocked sinus. Explained to pt. He said he is considering surgery with Dr Kaiser. He will need to hear potential risks, benefits and alts form performing surgeon.

## 2023-01-30 NOTE — DATA REVIEWED
[de-identified] : ct 12/2022 reviewed with pt - l ds; s/p fess, few ethmoid cells remaining b, open maxillary ostia, missing middle turbs; no active infx at present visible, reviewed with pt

## 2023-01-30 NOTE — PROCEDURE
[Posterior Lesion] : posterior lesion [Anterior rhinoscopy insufficient to account for symptoms] : anterior rhinoscopy insufficient to account for symptoms [Flexible Endoscope] : examined with the flexible endoscope [Serial Number: ___] : Serial Number: [unfilled] [Deviated to the Lt] : deviated to the left [Normal] : the paranasal sinuses had no abnormalities [FreeTextEntry6] : done for h/o recurrent sinusitis and due to l ds - could not see posterior nasal cavity\par clear x ds
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
## 2023-01-30 NOTE — PHYSICAL EXAM
[Nasal Endoscopy Performed] : nasal endoscopy was performed, see procedure section for findings [] : septum deviated to the left [de-identified] : operated [Midline] : trachea located in midline position [Normal] : no rashes [de-identified] : gait steady

## 2023-02-03 ENCOUNTER — NON-APPOINTMENT (OUTPATIENT)
Age: 38
End: 2023-02-03

## 2023-02-05 ENCOUNTER — NON-APPOINTMENT (OUTPATIENT)
Age: 38
End: 2023-02-05

## 2023-02-06 ENCOUNTER — NON-APPOINTMENT (OUTPATIENT)
Age: 38
End: 2023-02-06

## 2023-02-07 ENCOUNTER — NON-APPOINTMENT (OUTPATIENT)
Age: 38
End: 2023-02-07

## 2023-02-07 ENCOUNTER — TRANSCRIPTION ENCOUNTER (OUTPATIENT)
Age: 38
End: 2023-02-07

## 2023-02-08 ENCOUNTER — NON-APPOINTMENT (OUTPATIENT)
Age: 38
End: 2023-02-08

## 2023-02-10 RX ORDER — BUDESONIDE 0.5 MG/2ML
0.5 INHALANT ORAL
Qty: 180 | Refills: 2 | Status: ACTIVE | COMMUNITY
Start: 2018-12-20 | End: 1900-01-01

## 2023-02-23 ENCOUNTER — APPOINTMENT (OUTPATIENT)
Dept: INFECTIOUS DISEASE | Facility: CLINIC | Age: 38
End: 2023-02-23

## 2023-05-02 RX ORDER — AMOXICILLIN AND CLAVULANATE POTASSIUM 875; 125 MG/1; MG/1
875-125 TABLET, COATED ORAL
Qty: 20 | Refills: 0 | Status: ACTIVE | COMMUNITY
Start: 2023-05-02 | End: 1900-01-01

## 2023-05-19 NOTE — ASU PATIENT PROFILE, ADULT - NS PREOP UNDERSTANDS INFO
No solid food or milk products after 9:30pm 5/21/2023/ water no later than 3:30am DOS/ photo ID and insurance card/ comfortable clothing/ wife will take him home/yes

## 2023-05-21 ENCOUNTER — TRANSCRIPTION ENCOUNTER (OUTPATIENT)
Age: 38
End: 2023-05-21

## 2023-05-22 ENCOUNTER — RESULT REVIEW (OUTPATIENT)
Age: 38
End: 2023-05-22

## 2023-05-22 ENCOUNTER — TRANSCRIPTION ENCOUNTER (OUTPATIENT)
Age: 38
End: 2023-05-22

## 2023-05-22 ENCOUNTER — OUTPATIENT (OUTPATIENT)
Dept: OUTPATIENT SERVICES | Facility: HOSPITAL | Age: 38
LOS: 1 days | Discharge: HOME | End: 2023-05-22
Payer: COMMERCIAL

## 2023-05-22 ENCOUNTER — APPOINTMENT (OUTPATIENT)
Dept: OTOLARYNGOLOGY | Facility: HOSPITAL | Age: 38
End: 2023-05-22

## 2023-05-22 VITALS
RESPIRATION RATE: 16 BRPM | TEMPERATURE: 98 F | DIASTOLIC BLOOD PRESSURE: 77 MMHG | SYSTOLIC BLOOD PRESSURE: 135 MMHG | OXYGEN SATURATION: 98 % | HEART RATE: 93 BPM

## 2023-05-22 VITALS
HEIGHT: 74 IN | TEMPERATURE: 97 F | OXYGEN SATURATION: 100 % | WEIGHT: 213.19 LBS | DIASTOLIC BLOOD PRESSURE: 83 MMHG | SYSTOLIC BLOOD PRESSURE: 138 MMHG | HEART RATE: 58 BPM | RESPIRATION RATE: 16 BRPM

## 2023-05-22 DIAGNOSIS — Z87.81 PERSONAL HISTORY OF (HEALED) TRAUMATIC FRACTURE: Chronic | ICD-10-CM

## 2023-05-22 DIAGNOSIS — Z98.890 OTHER SPECIFIED POSTPROCEDURAL STATES: Chronic | ICD-10-CM

## 2023-05-22 DIAGNOSIS — Z90.89 ACQUIRED ABSENCE OF OTHER ORGANS: Chronic | ICD-10-CM

## 2023-05-22 PROCEDURE — 31276 NSL/SINS NDSC FRNT TISS RMVL: CPT | Mod: RT,22

## 2023-05-22 PROCEDURE — 61782 SCAN PROC CRANIAL EXTRA: CPT

## 2023-05-22 PROCEDURE — 88305 TISSUE EXAM BY PATHOLOGIST: CPT | Mod: 26

## 2023-05-22 PROCEDURE — 30520 REPAIR OF NASAL SEPTUM: CPT

## 2023-05-22 PROCEDURE — 88300 SURGICAL PATH GROSS: CPT | Mod: 26,59

## 2023-05-22 PROCEDURE — 30130 EXCISE INFERIOR TURBINATE: CPT | Mod: 50

## 2023-05-22 PROCEDURE — 31267 ENDOSCOPY MAXILLARY SINUS: CPT | Mod: 50

## 2023-05-22 PROCEDURE — 88311 DECALCIFY TISSUE: CPT | Mod: 26

## 2023-05-22 PROCEDURE — 31287 NASAL/SINUS ENDOSCOPY SURG: CPT | Mod: 50

## 2023-05-22 PROCEDURE — 31253 NSL/SINS NDSC TOTAL: CPT | Mod: LT

## 2023-05-22 DEVICE — STENT SINUS DRUG ELUDING PROPEL CONTOUR: Type: IMPLANTABLE DEVICE | Status: FUNCTIONAL

## 2023-05-22 DEVICE — SYS CATH BALLOON RELIEVA SINUS: Type: IMPLANTABLE DEVICE | Status: FUNCTIONAL

## 2023-05-22 DEVICE — STENT DRUG ELUTING SINUS BIO ABSORB INTERSECT ENT PROPEL 23MM: Type: IMPLANTABLE DEVICE | Status: FUNCTIONAL

## 2023-05-22 RX ORDER — SODIUM CHLORIDE 0.65 %
0.65 AEROSOL, SPRAY (ML) NASAL
Qty: 2 | Refills: 5 | Status: ACTIVE | COMMUNITY
Start: 2023-05-22 | End: 1900-01-01

## 2023-05-22 RX ORDER — OXYCODONE HYDROCHLORIDE 5 MG/1
5 TABLET ORAL ONCE
Refills: 0 | Status: DISCONTINUED | OUTPATIENT
Start: 2023-05-22 | End: 2023-05-22

## 2023-05-22 RX ORDER — AMANTADINE HCL 100 MG
1 CAPSULE ORAL
Refills: 0 | DISCHARGE

## 2023-05-22 RX ORDER — AZITHROMYCIN 250 MG/1
250 TABLET, FILM COATED ORAL
Qty: 1 | Refills: 0 | Status: ACTIVE | COMMUNITY
Start: 2023-05-22 | End: 1900-01-01

## 2023-05-22 RX ORDER — APREPITANT 80 MG/1
40 CAPSULE ORAL ONCE
Refills: 0 | Status: COMPLETED | OUTPATIENT
Start: 2023-05-22 | End: 2023-05-22

## 2023-05-22 RX ORDER — OXYCODONE AND ACETAMINOPHEN 5; 325 MG/1; MG/1
5-325 TABLET ORAL
Qty: 30 | Refills: 0 | Status: ACTIVE | COMMUNITY
Start: 2023-05-22 | End: 1900-01-01

## 2023-05-22 RX ORDER — SODIUM CHLORIDE 9 MG/ML
1000 INJECTION, SOLUTION INTRAVENOUS
Refills: 0 | Status: DISCONTINUED | OUTPATIENT
Start: 2023-05-22 | End: 2023-05-22

## 2023-05-22 RX ORDER — GABAPENTIN 400 MG/1
1 CAPSULE ORAL
Refills: 0 | DISCHARGE

## 2023-05-22 RX ORDER — HYDROMORPHONE HYDROCHLORIDE 2 MG/ML
0.5 INJECTION INTRAMUSCULAR; INTRAVENOUS; SUBCUTANEOUS
Refills: 0 | Status: DISCONTINUED | OUTPATIENT
Start: 2023-05-22 | End: 2023-05-22

## 2023-05-22 RX ORDER — ACETAMINOPHEN 500 MG
1000 TABLET ORAL ONCE
Refills: 0 | Status: COMPLETED | OUTPATIENT
Start: 2023-05-22 | End: 2023-05-22

## 2023-05-22 RX ORDER — FENTANYL CITRATE 50 UG/ML
25 INJECTION INTRAVENOUS
Refills: 0 | Status: DISCONTINUED | OUTPATIENT
Start: 2023-05-22 | End: 2023-05-22

## 2023-05-22 RX ORDER — PREDNISONE 10 MG/1
10 TABLET ORAL
Qty: 20 | Refills: 0 | Status: ACTIVE | COMMUNITY
Start: 2023-05-22 | End: 1900-01-01

## 2023-05-22 RX ADMIN — FENTANYL CITRATE 25 MICROGRAM(S): 50 INJECTION INTRAVENOUS at 13:00

## 2023-05-22 RX ADMIN — Medication 1000 MILLIGRAM(S): at 07:18

## 2023-05-22 RX ADMIN — APREPITANT 40 MILLIGRAM(S): 80 CAPSULE ORAL at 07:18

## 2023-05-22 RX ADMIN — FENTANYL CITRATE 25 MICROGRAM(S): 50 INJECTION INTRAVENOUS at 12:45

## 2023-05-22 NOTE — BRIEF OPERATIVE NOTE - NSICDXBRIEFPOSTOP_GEN_ALL_CORE_FT
POST-OP DIAGNOSIS:  Nasal septal deviation 22-May-2023 12:00:35  Helio Orozco  Hypertrophy of both inferior nasal turbinates 22-May-2023 12:00:47  Helio Orozco  Chronic maxillary sinusitis 22-May-2023 12:00:57  Helio Orozco  Chronic ethmoiditis 22-May-2023 12:01:04  Helio Orozco  Sphenoid sinusitis 22-May-2023 12:01:11  Helio Orozco  Chronic frontal sinusitis 22-May-2023 12:01:20  Helio Orozco

## 2023-05-22 NOTE — ASU DISCHARGE PLAN (ADULT/PEDIATRIC) - CARE PROVIDER_API CALL
Regan Kaiser)  Otolaryngology  [address] [place] [address], [address] [place] [address]  Phone: [phone]  Fax: [phone]  Established Patient  Scheduled Appointment: 05/25/2023

## 2023-05-22 NOTE — ASU PREOP CHECKLIST - 1.
as per patient no  signs and symptoms from covid and no positive test from covid within the past 10 days

## 2023-05-22 NOTE — ASU DISCHARGE PLAN (ADULT/PEDIATRIC) - NS MD DC FALL RISK RISK
For information on Fall & Injury Prevention, visit: [email or web address] OR  [email or web address] OR  https://www.cdc.gov/steadi/patient.html

## 2023-05-22 NOTE — BRIEF OPERATIVE NOTE - NSICDXBRIEFPREOP_GEN_ALL_CORE_FT
PRE-OP DIAGNOSIS:  Nasal septal deviation 22-May-2023 11:59:22  Helio Orozco  Hypertrophy of both inferior nasal turbinates 22-May-2023 11:59:31  Helio Orozco  Maxillary sinusitis, chronic 22-May-2023 11:59:43  Helio Orozco  Chronic ethmoiditis 22-May-2023 11:59:52  Helio Orozco  Sphenoid sinusitis 22-May-2023 12:00:03  Helio Orozco  Chronic frontal sinusitis 22-May-2023 12:00:22  Helio Orozco

## 2023-05-22 NOTE — BRIEF OPERATIVE NOTE - OPERATION/FINDINGS
Revision endoscopic image-guided sinus surgery, bilateral maxillary antrostomies, bilateral total ethmoidectomies, bilateral sphenoid sinusotomies, left frontal balloon sinuplasty, right frontal Draf IIb, septoplasty, bilateral inferior turbinate submucus resection Revision endoscopic image-guided sinus surgery, bilateral maxillary antrostomies, bilateral total ethmoidectomies, bilateral sphenoid sinusotomies, left frontal balloon sinuplasty, right frontal Draf IIb, septoplasty, bilateral inferior turbinate submucus resection.    Findings: bilateral nasal mucosa edematous, prior bilateral maxillary antrostomies, subtotal ethmoidectomies. Revision maxillary antrostomies and total ethmoidectomies performed. Mild mucoid material from bilateral maxillary sinuses and ethmoids. Bilateral sphenoid sinusotomies with mild mucoid material. Right Draf IIb performed. Left balloon frontal sinuplasty and sinusotomies. Bilateral Contour and Propel stents placed. Revision endoscopic image-guided sinus surgery, bilateral maxillary antrostomies, bilateral total ethmoidectomies, bilateral sphenoid sinusotomies, left frontal balloon sinuplasty, right frontal Draf IIb, septoplasty, bilateral inferior turbinate submucus resection.    Findings: bilateral nasal mucosa edematous, prior bilateral maxillary antrostomies, subtotal ethmoidectomies. Revision maxillary antrostomies and total ethmoidectomies performed. Mild mucoid material from bilateral maxillary sinuses and ethmoids. Bilateral sphenoid sinusotomies with mild mucoid material. Right Draf IIb performed. Left balloon frontal sinuplasty and sinusotomies. Septal deviation to the left anteriorly obstructing view of middle meatus. 4-0 chromic quilting suture placed through septum. Bilateral inferior turbinate hypertrophy significantly limiting view of choanae bilaterally. Bilateral Contour and Propel stents placed. Becker splints placed bilaterally and secured with 2-0 prolene.

## 2023-05-22 NOTE — ASU DISCHARGE PLAN (ADULT/PEDIATRIC) - ASU DC SPECIAL INSTRUCTIONSFT
SINUS SURGERY DISCHARGE INSTRUCTIONS:     Discharge Instructions: Otolaryngology     Procedure: revision endoscopic sinus surgery, bilateral maxillary antrostomies, bilateral total ethmoidectomies, bilateral sphenoid sinusotomies, right Draf 2b, left balloon frontal sinuplasty, septoplasty, bilateral inferior turbinate submucus resection     Activity:   No bending, lifting greater than 10-15lbs, or straining for 2 weeks.  No rigorous activity until permitted.  No running, aerobics, golf, etc for 2 weeks  No swimming or diving until permitted.     Restrictions:  Do not suppress the need to cough or sneeze, but do so with your mouth open.  This will relieve pressure on the nose. Do not blow your nose.  DO NOT USE ANY ASPIRIN, IBUPROFEN OR ASPIRIN-LIKE PRODUCTS UNTIL PERMITTED. If you are uncertain about the use of a medicine, please call the office.     Wound Care:  A small amount of bleeding or nasal discharge will occur from your nose during the first 48 hours following your operation.  This will gradually go away over the course of the next several days. Change the gauze drip pad under your nose as necessary (usually several times a day at first).  You may remove the gauze pad while dining to make it more comfortable for you to eat.  You can leave the drip pad off and dab your nose with gauze.    Dr. Kaiser sent a prescription for steroids to your pharmacy. Use as directed.     After 2 to 3 weeks you may have some thick brown drainage from your nose. This is mucous and old blood, and is normal, as the sinuses begin to clear themselves. It does not indicate infection.     YOU SHOULD USE SALINE NASAL SPRAY EVERY ONE TO TWO HOURS while you are awake until you are instructed otherwise.  Squirt two sprays in each nostril.  This will help moisten your nose and prevent large crusts from forming in your nose and sinuses.      If you were given a prescription for antibiotics, these should be completed until your healing is nearly complete. Diarrhea from antibiotic usage can occur and lead to a serious health problem.  If diarrhea should occur, you should discontinue the use of the antibiotic and call the office so that we can prescribe a different antibiotic for you. If the diarrhea is severe or persistent, you should contact the office. Eating yogurt every day or every other day can help decrease the risk of diarrhea.     Take your pain medication as instructed.  Often, extra-strength Tylenol (acetaminophen) is sufficient for pain relief.  DO NOT USE ANY ASPIRIN, IBUPROFEN OR ASPIRIN-LIKE PRODUCTS UNTIL PERMITTED.  If you are uncertain about the use of medicine, please call this office. Dr. Kaiser has sent percocet to your pharmacy from his office.     Monitor your temperature for elevations above 101°; look for clear watery nasal drainage; note any change in your vision or swelling in your eyes; note any worsening headache or neck stiffness.  Report any of these findings to this office as soon as possible.     If You Have Nausea:  Although we give anti-nausea medicines before, during and after your surgery, it’s still not unusual to have some nausea following surgery.     Relax, decrease your activity and don’t eat any heavy foods—just try some clear liquids. All nausea should be over 8-10 hours following surgery.     Dry Mouth:  Because your nose will be congested and you will be wearing a drip pad your mouth will become quite dry.  Drinking cold liquids and sucking on cough drops or hard candy will help to alleviate the problem.     Teeth Pain:  When surgery is performed on the septum of the nose this can cause numbness and pain in your front teeth.  This does not indicate you are having dental issues and will begin to subside 1 week after surgery.     Diet:  There are no diet restrictions.     Because of nasal congestion after surgery you may not have a sense of taste.  It is still important to eat a balanced and healthy diet to help with healing and recovery.  Your sense of taste will return after your nose has been cleaned in the office on your first post-op visit.     Call MD for:  Fever of up to 101 F following surgery is common.  Please call us if your fever reaches over 101.5 F.     Clear, watery nasal drainage.     Change in vision.     Worsening headache or neck stiffness.     Severe or persistent diarrhea (from antibiotics).     Increased or persistent bleeding for an extended period of time (soaking mustache dressing in less in than 30 min)        Follow-up: Follow up with Dr. Kaiser in his office on Thursday, May 25th

## 2023-05-22 NOTE — ASU DISCHARGE PLAN (ADULT/PEDIATRIC) - PROCEDURE
revision endoscopic sinus surgery, bilateral maxillary antrostomies, bilateral total ethmoidectomies, bilateral sphenoid sinusotomies, right Draf 2b, left balloon frontal sinuplasty, septoplasty, bilateral inferior turbinate submucus resection

## 2023-05-22 NOTE — BRIEF OPERATIVE NOTE - NSICDXBRIEFPROCEDURE_GEN_ALL_CORE_FT
PROCEDURES:  Endoscopic sinus surgery 22-May-2023 11:55:16  Helio Orozco  Septoplasty 22-May-2023 11:55:28  Helio Orozco  Resection, submucosa, inferior turbinate 22-May-2023 11:56:06  Helio Orozco  Maxillary antrostomy, bilateral 22-May-2023 11:56:26  Helio Orozco  Endoscopic total ethmoidectomy 22-May-2023 11:56:55  Helio Orozco  Endoscopic bilateral sphenoidotomy 22-May-2023 11:57:07  Helio Orozco  Balloon sinuplasty of left frontal sinus 22-May-2023 11:58:00  Helio Orozco  Sinusotomy of right frontal sinus 22-May-2023 11:59:07  Helio Orozco   Pt received supine in bed, cooperative with physical therapy evaluation

## 2023-05-25 ENCOUNTER — APPOINTMENT (OUTPATIENT)
Dept: OTOLARYNGOLOGY | Facility: CLINIC | Age: 38
End: 2023-05-25
Payer: COMMERCIAL

## 2023-05-25 PROBLEM — Z86.69 PERSONAL HISTORY OF OTHER DISEASES OF THE NERVOUS SYSTEM AND SENSE ORGANS: Chronic | Status: ACTIVE | Noted: 2023-05-19

## 2023-05-25 PROBLEM — S09.90XA UNSPECIFIED INJURY OF HEAD, INITIAL ENCOUNTER: Chronic | Status: ACTIVE | Noted: 2023-05-19

## 2023-05-25 PROCEDURE — 31237 NSL/SINS NDSC SURG BX POLYPC: CPT | Mod: 58

## 2023-05-25 PROCEDURE — 99024 POSTOP FOLLOW-UP VISIT: CPT

## 2023-05-25 RX ORDER — BUDESONIDE 0.5 MG/2ML
0.5 INHALANT ORAL
Qty: 2 | Refills: 3 | Status: ACTIVE | COMMUNITY
Start: 2017-05-02 | End: 1900-01-01

## 2023-05-25 NOTE — PROCEDURE
[FreeTextEntry3] : doyles removed\par \par Nasal Endoscopy:\par coagulum and debris suctioned\par nasal airways patent\par septum flat\par propels/contours in place\par no mucopus or polyps

## 2023-05-25 NOTE — ASSESSMENT
[FreeTextEntry1] : 37M here in first postoperative followup s/p revision septoplasty/revision ESS (b/l maxillary, frontal, sphenoid and left ethmoid) 5/22/23 for recurrent acute on chronic pansinusitis. Intraoperatively, diffusely osteitic thick bone throughout and right draf2b done. He is very congested since surgery. He is on the meds as prescribed. Pain is controlled. On exam, nasal endoscopy shows expected postoperative changes. He felt markedly better after splint removal and was breathing great.\par For now, continue to take it easy. No heavy lifting or strenuous activity. To start BID budesonide rinses now. Finish meds as prescribed. RTO next week for routine postop debridement.\par

## 2023-05-25 NOTE — DATA REVIEWED
[de-identified] : CT 12/12/22:\par FINDINGS:\par \par Since prior study this patient is status post bilateral antrostomy and middle turbinectomy. The maxillary outflow is patent. The upper aspect of the nasal septum is deviated to the right. The anterior tip of the nasal septum is deviated to the left with inferior turbinate hypertrophy narrowing the nasal passages.\par \par There is a small polyp or retention cyst in the right maxillary sinus. The remainder the maxillary sinuses are clear.\par \par The residual ethmoid air cells, frontoethmoidal recesses and frontal sinus are clear.\par \par The sphenoid sinus and sphenoethmoidal recesses are clear.\par \par There is no posterior pharyngeal mass. The lamina papyracea, eulalia caity and cribriform plate are normal.\par \par The osteomeatal units are patent. The nasal septum is midline. The visualized soft tissues of the nasopharynx, orbits and brain are unremarkable. The mastoid air cells are normally aerated.\par \par IMPRESSION:\par \par 1. There is deviation of the upper nasal septum to the right in the anterior tip of the nasal septum to the left with inferior turbinate hypertrophy narrowing the nasal passages.\par \par 2. Interval bilateral antrostomy and middle turbinectomy with patent maxillary outflow. Small polyp or retention cyst right maxillary sinus. Previous extensive opacification right maxillary sinus is no longer present.\par \par 3. The remainder the paranasal sinuses and recesses are clear.

## 2023-05-25 NOTE — CONSULT LETTER
[Dear  ___] : Dear  [unfilled], [Courtesy Letter:] : I had the pleasure of seeing your patient, [unfilled], in my office today. [Consult Closing:] : Thank you very much for allowing me to participate in the care of this patient.  If you have any questions, please do not hesitate to contact me. [Sincerely,] : Sincerely, [FreeTextEntry3] : Regan Kaiser MD\par Department of Otolaryngology - Head and Neck Surgery\par [facility]

## 2023-05-25 NOTE — HISTORY OF PRESENT ILLNESS
[de-identified] : 37M here in first postoperative followup s/p revision septoplasty/revision ESS (b/l maxillary, frontal, sphenoid and left ethmoid) 5/22/23 for recurrent acute on chronic pansinusitis. Intraoperatively, diffusely osteitic thick bone throughout and right draf2b done.\par \par He is very congested since surgery. He is on the meds as prescribed. Pain is controlled.\par \par ROS otherwise unremarkable

## 2023-06-01 ENCOUNTER — APPOINTMENT (OUTPATIENT)
Dept: OTOLARYNGOLOGY | Facility: CLINIC | Age: 38
End: 2023-06-01
Payer: COMMERCIAL

## 2023-06-01 DIAGNOSIS — J34.2 DEVIATED NASAL SEPTUM: ICD-10-CM

## 2023-06-01 PROCEDURE — 99024 POSTOP FOLLOW-UP VISIT: CPT

## 2023-06-01 PROCEDURE — 31237 NSL/SINS NDSC SURG BX POLYPC: CPT | Mod: 58

## 2023-06-01 NOTE — PROCEDURE
[FreeTextEntry3] : propels removed\par \par Nasal Endoscopy:\par coagulum and debris removed\par nasal airways and paranasal sinuses patent\par septum flat\par contours in place\par no mucopus or polyps

## 2023-06-01 NOTE — HISTORY OF PRESENT ILLNESS
[de-identified] : 37M here in second postoperative followup s/p revision septoplasty/revision ESS (b/l maxillary, frontal, sphenoid and left ethmoid) 5/22/23 for recurrent acute on chronic pansinusitis. Intraoperatively, diffusely osteitic thick bone throughout and right draf2b done.\par \par He is a bit congested since last seen w some cheek pressure. Overall though he is improving each day. There is no pain. He is using BID budesonide rinses.\par \par ROS otherwise unremarkable

## 2023-06-01 NOTE — DATA REVIEWED
[de-identified] : CT 12/12/22:\par FINDINGS:\par \par Since prior study this patient is status post bilateral antrostomy and middle turbinectomy. The maxillary outflow is patent. The upper aspect of the nasal septum is deviated to the right. The anterior tip of the nasal septum is deviated to the left with inferior turbinate hypertrophy narrowing the nasal passages.\par \par There is a small polyp or retention cyst in the right maxillary sinus. The remainder the maxillary sinuses are clear.\par \par The residual ethmoid air cells, frontoethmoidal recesses and frontal sinus are clear.\par \par The sphenoid sinus and sphenoethmoidal recesses are clear.\par \par There is no posterior pharyngeal mass. The lamina papyracea, eulalia caity and cribriform plate are normal.\par \par The osteomeatal units are patent. The nasal septum is midline. The visualized soft tissues of the nasopharynx, orbits and brain are unremarkable. The mastoid air cells are normally aerated.\par \par IMPRESSION:\par \par 1. There is deviation of the upper nasal septum to the right in the anterior tip of the nasal septum to the left with inferior turbinate hypertrophy narrowing the nasal passages.\par \par 2. Interval bilateral antrostomy and middle turbinectomy with patent maxillary outflow. Small polyp or retention cyst right maxillary sinus. Previous extensive opacification right maxillary sinus is no longer present.\par \par 3. The remainder the paranasal sinuses and recesses are clear.

## 2023-06-01 NOTE — ASSESSMENT
[FreeTextEntry1] : 37M here in second postoperative followup s/p revision septoplasty/revision ESS (b/l maxillary, frontal, sphenoid and left ethmoid) 5/22/23 for recurrent acute on chronic pansinusitis. Intraoperatively, diffusely osteitic thick bone throughout and right draf2b done. He is a bit congested since last seen w some cheek pressure. Overall though he is improving each day. There is no pain. He is using BID budesonide rinses. On exam, nasal endoscopy shows expected well healing postoperative changes. He felt markedly better after debridement.\par He is doing well. For now, continue BID budesonide rinses now. Slowly advance activity. RTO 4 weeks.\par

## 2023-06-01 NOTE — CONSULT LETTER
[Dear  ___] : Dear  [unfilled], [Courtesy Letter:] : I had the pleasure of seeing your patient, [unfilled], in my office today. [Consult Closing:] : Thank you very much for allowing me to participate in the care of this patient.  If you have any questions, please do not hesitate to contact me. [Sincerely,] : Sincerely, [FreeTextEntry3] : Regan Kaiser MD\par Department of Otolaryngology - Head and Neck Surgery\par [facility]

## 2023-06-07 LAB — EAR NOSE AND THROAT CULTURE: NORMAL

## 2023-06-13 LAB — SURGICAL PATHOLOGY STUDY: SIGNIFICANT CHANGE UP

## 2023-07-02 RX ORDER — PREDNISONE 10 MG/1
10 TABLET ORAL
Qty: 20 | Refills: 0 | Status: ACTIVE | COMMUNITY
Start: 2023-07-02 | End: 1900-01-01

## 2023-07-02 RX ORDER — SULFAMETHOXAZOLE AND TRIMETHOPRIM 800; 160 MG/1; MG/1
800-160 TABLET ORAL TWICE DAILY
Qty: 28 | Refills: 0 | Status: ACTIVE | COMMUNITY
Start: 2023-07-02 | End: 1900-01-01

## 2023-07-02 RX ORDER — GUAIFENESIN AND PSEUDOEPHEDRINE HYDROCHLORIDE 1200; 120 MG/1; MG/1
120-1200 TABLET, EXTENDED RELEASE ORAL
Qty: 1 | Refills: 0 | Status: ACTIVE | COMMUNITY
Start: 2023-07-02 | End: 1900-01-01

## 2023-07-17 ENCOUNTER — APPOINTMENT (OUTPATIENT)
Dept: OTOLARYNGOLOGY | Facility: CLINIC | Age: 38
End: 2023-07-17
Payer: COMMERCIAL

## 2023-07-17 VITALS
SYSTOLIC BLOOD PRESSURE: 140 MMHG | TEMPERATURE: 96.8 F | DIASTOLIC BLOOD PRESSURE: 80 MMHG | WEIGHT: 210 LBS | HEART RATE: 82 BPM | BODY MASS INDEX: 26.95 KG/M2 | HEIGHT: 74 IN

## 2023-07-17 DIAGNOSIS — J34.2 DEVIATED NASAL SEPTUM: ICD-10-CM

## 2023-07-17 PROCEDURE — 31231 NASAL ENDOSCOPY DX: CPT | Mod: 58

## 2023-07-17 PROCEDURE — 99024 POSTOP FOLLOW-UP VISIT: CPT

## 2023-07-17 NOTE — PROCEDURE
[FreeTextEntry3] : Nasal Endoscopy:\par crusting removed over b/l IT root\par nasal airways and paranasal sinuses widely patent\par septum flat\par no mucopus or polyps

## 2023-07-17 NOTE — HISTORY OF PRESENT ILLNESS
[de-identified] : 37M here in routine postoperative followup s/p revision septoplasty/revision ESS (b/l maxillary, frontal, sphenoid and left ethmoid) 5/22/23 for recurrent acute on chronic pansinusitis. Intraoperatively, diffusely osteitic thick bone throughout and right draf2b done.\par \par Since last visit, two weeks ago had worsening mucus and congestion and preemptively took bactrim/prednisone. Today, he feels well with minimal complaints. He is on BID budesonide rinses. Mild sore throat.\par \par Pathology:\par 1. Right sinus contents:\par - Chronic sinusitis.\par 2. Right frontal contents:\par - Chronic sinusitis and bone.\par 3. Left sinus contents:\par - Mild chronic sinusitis and bone.\par 4. Bilateral sinus shavings:\par - Necrotic sinonasal mucosa with superficial fungal elements.\par - Fragments of bone.\par 5. Nasal septum:\par - On gross examination\par \par ROS otherwise unremarkable

## 2023-07-17 NOTE — CONSULT LETTER
[Dear  ___] : Dear  [unfilled], [Courtesy Letter:] : I had the pleasure of seeing your patient, [unfilled], in my office today. [Consult Closing:] : Thank you very much for allowing me to participate in the care of this patient.  If you have any questions, please do not hesitate to contact me. [Sincerely,] : Sincerely, [FreeTextEntry3] : Regan Kaiser MD\par Department of Otolaryngology - Head and Neck Surgery\par [facility]

## 2023-07-17 NOTE — ASSESSMENT
[FreeTextEntry1] : 37M here in routine postoperative followup s/p revision septoplasty/revision ESS (b/l maxillary, frontal, sphenoid and left ethmoid) 5/22/23 for recurrent acute on chronic pansinusitis. Intraoperatively, diffusely osteitic thick bone throughout and right draf2b done. Since last visit, two weeks ago had worsening mucus and congestion and preemptively took bactrim/prednisone. Today, he feels well with minimal complaints. He is on BID budesonide rinses. Mild sore throat.\par 37M here in second postoperative followup s/p revision septoplasty/revision ESS (b/l maxillary, frontal, sphenoid and left ethmoid) 5/22/23 for recurrent acute on chronic pansinusitis. Intraoperatively, diffusely osteitic thick bone throughout and right draf2b done. He is a bit congested since last seen w some cheek pressure. Overall though he is improving each day. There is no pain. He is using BID budesonide rinses. On exam, nasal endoscopy shows expected well healing postoperative changes. He felt markedly better after debridement.\par He is doing well and exam shows well healing expected changes. Unclear if recent episode was actual sinusitis? or just crusting buildup/postsurgical debris? For now, he looks good. Continue BID budesonide rinses. RTO 2 months.

## 2023-07-17 NOTE — DATA REVIEWED
[de-identified] : CT 12/12/22:\par FINDINGS:\par \par Since prior study this patient is status post bilateral antrostomy and middle turbinectomy. The maxillary outflow is patent. The upper aspect of the nasal septum is deviated to the right. The anterior tip of the nasal septum is deviated to the left with inferior turbinate hypertrophy narrowing the nasal passages.\par \par There is a small polyp or retention cyst in the right maxillary sinus. The remainder the maxillary sinuses are clear.\par \par The residual ethmoid air cells, frontoethmoidal recesses and frontal sinus are clear.\par \par The sphenoid sinus and sphenoethmoidal recesses are clear.\par \par There is no posterior pharyngeal mass. The lamina papyracea, eulalia caity and cribriform plate are normal.\par \par The osteomeatal units are patent. The nasal septum is midline. The visualized soft tissues of the nasopharynx, orbits and brain are unremarkable. The mastoid air cells are normally aerated.\par \par IMPRESSION:\par \par 1. There is deviation of the upper nasal septum to the right in the anterior tip of the nasal septum to the left with inferior turbinate hypertrophy narrowing the nasal passages.\par \par 2. Interval bilateral antrostomy and middle turbinectomy with patent maxillary outflow. Small polyp or retention cyst right maxillary sinus. Previous extensive opacification right maxillary sinus is no longer present.\par \par 3. The remainder the paranasal sinuses and recesses are clear.

## 2023-08-02 RX ORDER — BUDESONIDE 0.5 MG/2ML
0.5 INHALANT ORAL
Qty: 6 | Refills: 3 | Status: ACTIVE | COMMUNITY
Start: 2023-08-02 | End: 1900-01-01

## 2023-09-19 ENCOUNTER — APPOINTMENT (OUTPATIENT)
Dept: OTOLARYNGOLOGY | Facility: CLINIC | Age: 38
End: 2023-09-19

## 2023-10-05 ENCOUNTER — APPOINTMENT (OUTPATIENT)
Dept: OTOLARYNGOLOGY | Facility: CLINIC | Age: 38
End: 2023-10-05
Payer: COMMERCIAL

## 2023-10-05 PROCEDURE — 31231 NASAL ENDOSCOPY DX: CPT

## 2023-10-05 PROCEDURE — 99213 OFFICE O/P EST LOW 20 MIN: CPT | Mod: 25

## 2023-12-06 ENCOUNTER — NON-APPOINTMENT (OUTPATIENT)
Age: 38
End: 2023-12-06

## 2023-12-08 ENCOUNTER — APPOINTMENT (OUTPATIENT)
Dept: ENDOCRINOLOGY | Facility: CLINIC | Age: 38
End: 2023-12-08
Payer: COMMERCIAL

## 2023-12-08 DIAGNOSIS — I10 ESSENTIAL (PRIMARY) HYPERTENSION: ICD-10-CM

## 2023-12-08 PROCEDURE — 99213 OFFICE O/P EST LOW 20 MIN: CPT | Mod: 95

## 2023-12-14 ENCOUNTER — NON-APPOINTMENT (OUTPATIENT)
Age: 38
End: 2023-12-14

## 2023-12-14 ENCOUNTER — APPOINTMENT (OUTPATIENT)
Dept: ENDOCRINOLOGY | Facility: CLINIC | Age: 38
End: 2023-12-14
Payer: COMMERCIAL

## 2023-12-14 DIAGNOSIS — E23.0 HYPOPITUITARISM: ICD-10-CM

## 2023-12-14 DIAGNOSIS — R53.83 OTHER FATIGUE: ICD-10-CM

## 2023-12-14 DIAGNOSIS — R19.7 DIARRHEA, UNSPECIFIED: ICD-10-CM

## 2023-12-14 PROCEDURE — 99214 OFFICE O/P EST MOD 30 MIN: CPT | Mod: 95

## 2023-12-14 RX ORDER — SULFAMETHOXAZOLE AND TRIMETHOPRIM 800; 160 MG/1; MG/1
800-160 TABLET ORAL TWICE DAILY
Qty: 28 | Refills: 0 | Status: ACTIVE | COMMUNITY
Start: 2023-12-14 | End: 1900-01-01

## 2023-12-14 NOTE — HISTORY OF PRESENT ILLNESS
[FreeTextEntry1] : 32 y.o. male referred for evaluation of low testosterone found incidentally during an evaluation for fatigue. Total T was 149 ng/dl; free T was 6.0 pg/ml. PRL was 16. TFTs were normal. LH and FSH were not obtained. The patient reports good libido and good erectile function. He plans to have children at some point in the future. 11/12/18. The patient is feeling well. He is on treatment with Clomid. Has lost 5 lb. Testosterone levels improved, but fatigue persists. He is planning to have children in 2-3 yrs. Pituitary MRI was normal. 5/16/19. The patient is doing well except his fatigue persists. He discontinued Clomid 3 months ago. Is planning to get  in 5 months. His weight is stable. 6/28/21. The patient is doing well overall except for persisting fatigue. He had a baby born 5 weeks ago. PMD obtained a GH level which was undetectable. MRI of the pituitary was normal in 7/20/2018. He is wondering if GH tx is indicated. 12/14/21. The patient's condition is unchanged. He continues to c/o fatigue and headaches but otherwise feels well. He has a 7 months old baby and is definitely planning more children. 5/3/22. The patient continues to c/o fatigue. He is not on any tx for hypogonadism at this time. OGTT test was normal. He and his wife are planning more children. 12/14/23.  This visit was provided via telehealth using real-time 2-way audio visual technology. The patient was located at home at the time of the visit.  The provider, Sha Cardenas, , was located at the medical office located in [place], [place] at the time of the visit.  The patient and Provider participated in the telehealth encounter.  Verbal consent given by the patient.  The patient has fathered another child (born in 8/2023) and is planning one more child. He was diagnosed with monoclonal gammopathy and has developed peripheral neuropathy for which he is now on nortriptyline. He has developed facial flushing and diarrhea but was told that these symptoms are not due to his medications or gammopathy. His weight is stable. He is scheduled for colonoscopy.

## 2023-12-14 NOTE — ADDENDUM
[FreeTextEntry1] : This time included review of previous records, lab. and radiological data, discussing findings, differential diagnosis, further testing and evaluation, therapeutic options, renewing medications, completing the record.

## 2023-12-14 NOTE — REVIEW OF SYSTEMS
[As Noted in HPI] : as noted in HPI [Negative] : Heme/Lymph [FreeTextEntry2] : Facial flushing and diarrhea

## 2023-12-14 NOTE — ASSESSMENT
[FreeTextEntry1] : Hypogonadism. Facial flushing. Diarrhea.  Will r/o carcinoid syndrome, pheochromocytoma and other causes of facial flushing. Lab tests ordered (to be done after one week off nortriptyline). F/U once the above results are available.

## 2024-01-10 ENCOUNTER — NON-APPOINTMENT (OUTPATIENT)
Age: 39
End: 2024-01-10

## 2024-01-10 ENCOUNTER — TRANSCRIPTION ENCOUNTER (OUTPATIENT)
Age: 39
End: 2024-01-10

## 2024-03-12 ENCOUNTER — APPOINTMENT (OUTPATIENT)
Dept: ENDOCRINOLOGY | Facility: CLINIC | Age: 39
End: 2024-03-12
Payer: COMMERCIAL

## 2024-03-12 DIAGNOSIS — R79.89 OTHER SPECIFIED ABNORMAL FINDINGS OF BLOOD CHEMISTRY: ICD-10-CM

## 2024-03-12 DIAGNOSIS — R23.2 FLUSHING: ICD-10-CM

## 2024-03-12 PROCEDURE — 99214 OFFICE O/P EST MOD 30 MIN: CPT

## 2024-03-12 NOTE — ADDENDUM
[FreeTextEntry1] : This time included review of previous records,  lab. data, discussing findings, differential diagnosis, further testing and evaluation, therapeutic options, renewing medications, completing the record.

## 2024-03-12 NOTE — HISTORY OF PRESENT ILLNESS
[FreeTextEntry1] : 32 y.o. male referred for evaluation of low testosterone found incidentally during an evaluation for fatigue. Total T was 149 ng/dl; free T was 6.0 pg/ml. PRL was 16. TFTs were normal. LH and FSH were not obtained. The patient reports good libido and good erectile function. He plans to have children at some point in the future. 11/12/18. The patient is feeling well. He is on treatment with Clomid. Has lost 5 lb. Testosterone levels improved, but fatigue persists. He is planning to have children in 2-3 yrs. Pituitary MRI was normal. 5/16/19. The patient is doing well except his fatigue persists. He discontinued Clomid 3 months ago. Is planning to get  in 5 months. His weight is stable. 6/28/21. The patient is doing well overall except for persisting fatigue. He had a baby born 5 weeks ago. PMD obtained a GH level which was undetectable. MRI of the pituitary was normal in 7/20/2018. He is wondering if GH tx is indicated. 12/14/21. The patient's condition is unchanged. He continues to c/o fatigue and headaches but otherwise feels well. He has a 7 months old baby and is definitely planning more children. 5/3/22. The patient continues to c/o fatigue. He is not on any tx for hypogonadism at this time. OGTT test was normal. He and his wife are planning more children. 12/14/23.  This visit was provided via telehealth using real-time 2-way audio visual technology. The patient was located at home at the time of the visit.  The provider, Sha Cardenas, , was located at the medical office located in [place], [place] at the time of the visit.  The patient and Provider participated in the telehealth encounter.  Verbal consent given by the patient.  The patient has fathered another child (born in 8/2023) and is planning one more child. He was diagnosed with monoclonal gammopathy and has developed peripheral neuropathy for which he is now on nortriptyline. He has developed facial flushing and diarrhea but was told that these symptoms are not due to his medications or gammopathy. His weight is stable. He is scheduled for colonoscopy. 3/12/24. This visit was provided via telehealth using real-time 2-way audio visual technology. The patient was located at home at the time of the visit.  The provider, Sha Cardenas, , was located at the medical office located in [place], [place] at the time of the visit.  The patient and Provider participated in the telehealth encounter.  Verbal consent given by the patient. The patient continues to experience intermittent facial flushing often triggered by food intake. Endocrine w/u has been unrevealing. He also reports intermittently abnormal LFTs. Has been seen  at [facility]. Presumptive dx is mast cell activation. His weight has been stable at about 210 lb (he is 6ft 3 " tall).

## 2024-03-12 NOTE — ASSESSMENT
[FreeTextEntry1] : Hypogonadism Facial flushing due to mast cell activation (?) Abnormal LFTs Overweight  Alergist referral Consider fibroscan - NAFLD? Discussed the use of Mounjaro if NAFLD is diagnosed F/U once the above results are available

## 2024-04-09 ENCOUNTER — APPOINTMENT (OUTPATIENT)
Dept: OTOLARYNGOLOGY | Facility: CLINIC | Age: 39
End: 2024-04-09

## 2024-05-28 ENCOUNTER — APPOINTMENT (OUTPATIENT)
Dept: OTOLARYNGOLOGY | Facility: CLINIC | Age: 39
End: 2024-05-28
Payer: COMMERCIAL

## 2024-05-28 DIAGNOSIS — H93.8X3 OTHER SPECIFIED DISORDERS OF EAR, BILATERAL: ICD-10-CM

## 2024-05-28 DIAGNOSIS — J32.4 CHRONIC PANSINUSITIS: ICD-10-CM

## 2024-05-28 PROCEDURE — 92557 COMPREHENSIVE HEARING TEST: CPT

## 2024-05-28 PROCEDURE — 31231 NASAL ENDOSCOPY DX: CPT | Mod: 52

## 2024-05-28 PROCEDURE — 92550 TYMPANOMETRY & REFLEX THRESH: CPT | Mod: 52

## 2024-05-28 PROCEDURE — 99213 OFFICE O/P EST LOW 20 MIN: CPT | Mod: 25

## 2024-05-28 NOTE — DATA REVIEWED
[de-identified] : CT 12/12/22:\par  FINDINGS:\par  \par  Since prior study this patient is status post bilateral antrostomy and middle turbinectomy. The maxillary outflow is patent. The upper aspect of the nasal septum is deviated to the right. The anterior tip of the nasal septum is deviated to the left with inferior turbinate hypertrophy narrowing the nasal passages.\par  \par  There is a small polyp or retention cyst in the right maxillary sinus. The remainder the maxillary sinuses are clear.\par  \par  The residual ethmoid air cells, frontoethmoidal recesses and frontal sinus are clear.\par  \par  The sphenoid sinus and sphenoethmoidal recesses are clear.\par  \par  There is no posterior pharyngeal mass. The lamina papyracea, eulalia caity and cribriform plate are normal.\par  \par  The osteomeatal units are patent. The nasal septum is midline. The visualized soft tissues of the nasopharynx, orbits and brain are unremarkable. The mastoid air cells are normally aerated.\par  \par  IMPRESSION:\par  \par  1. There is deviation of the upper nasal septum to the right in the anterior tip of the nasal septum to the left with inferior turbinate hypertrophy narrowing the nasal passages.\par  \par  2. Interval bilateral antrostomy and middle turbinectomy with patent maxillary outflow. Small polyp or retention cyst right maxillary sinus. Previous extensive opacification right maxillary sinus is no longer present.\par  \par  3. The remainder the paranasal sinuses and recesses are clear.

## 2024-05-28 NOTE — HISTORY OF PRESENT ILLNESS
[de-identified] : 38M here in routine followup.  He is s/p revision septoplasty/revision ESS (b/l maxillary, frontal, sphenoid and left ethmoid) 5/22/23 for recurrent acute on chronic pansinusitis. Intraoperatively, diffusely osteitic thick bone throughout and right draf2b done.  Since last visit 7 months ago he is doing ok. Whereas the first 6-8 months since surgery he was great, he feels there is a slight regression - some increased congestion and mucus in the rinse. He is on BID budesonide rinses.  There is also ear fullness/pressure and sometimes water gets stuck in right ear when swimming and that it can stay there for days. He has been on one course of abx since last seen.  Audiogram from today: -hearing symmetric and wnl -SRT 10/5, 100% -type A  ROS otherwise unremarkable

## 2024-05-28 NOTE — CONSULT LETTER
[Dear  ___] : Dear  [unfilled], [Courtesy Letter:] : I had the pleasure of seeing your patient, [unfilled], in my office today. [Consult Closing:] : Thank you very much for allowing me to participate in the care of this patient.  If you have any questions, please do not hesitate to contact me. [Sincerely,] : Sincerely, [FreeTextEntry3] : Regan Kaiser MD\par  Department of Otolaryngology - Head and Neck Surgery\par  [facility]

## 2024-05-28 NOTE — PHYSICAL EXAM
[Nasal Endoscopy Performed] : nasal endoscopy was performed, see procedure section for findings [Midline] : trachea located in midline position [Normal] : no rashes [de-identified] : eac clear, tm intact, hypomobile, me clear

## 2024-05-28 NOTE — PROCEDURE
[FreeTextEntry3] : Nasal Endoscopy: nasal airways and paranasal sinuses widely patent septum flat no mucopus or polyps choana clear

## 2024-05-28 NOTE — ASSESSMENT
[FreeTextEntry1] : 38M here in routine followup. He is s/p revision septoplasty/revision ESS (b/l maxillary, frontal, sphenoid and left ethmoid) 5/22/23 for recurrent acute on chronic pansinusitis. Intraoperatively, diffusely osteitic thick bone throughout and right draf2b done. Since last visit 7 months ago he is doing ok. Whereas the first 6-8 months since surgery he felt great, he feels there is a slight regression - some increased congestion and mucus in the rinse. He is on BID budesonide rinses.  There is also ear fullness/pressure and sometimes water gets stuck in right ear when swimming and that it can stay there for days. He has been on one course of abx these past 7 months, around 3-4 months ago. Audiogram from today is completely normal, as above. On exam, nasal endoscopy shows well healed postoperative changes w widely patent nasal airways and paranasal sinuses with no mucopus, polyps or drainage. Ears are normal. Despite his complaints, the objective exam looks great and pt reassured. His ear sx likely due to some degree of Eustachian tube dysfunction, as sudafed does give some relief. For now, though, I would simply continue BID budesonide rinses and RTO 6 months.  As an side, regarding the final pathology of 'superficial fungal elements' in the sinus shavings, it is not unreasonable that he has some form of allergic fungal sinusitis given his history, which places him at the inflammatory extreme of sinus disease.

## 2024-07-30 ENCOUNTER — APPOINTMENT (OUTPATIENT)
Dept: OTOLARYNGOLOGY | Facility: CLINIC | Age: 39
End: 2024-07-30

## 2024-07-30 ENCOUNTER — APPOINTMENT (OUTPATIENT)
Dept: OTOLARYNGOLOGY | Facility: CLINIC | Age: 39
End: 2024-07-30
Payer: COMMERCIAL

## 2024-07-30 DIAGNOSIS — H93.8X1 OTHER SPECIFIED DISORDERS OF RIGHT EAR: ICD-10-CM

## 2024-07-30 DIAGNOSIS — J32.4 CHRONIC PANSINUSITIS: ICD-10-CM

## 2024-07-30 PROCEDURE — 92567 TYMPANOMETRY: CPT

## 2024-07-30 PROCEDURE — 31231 NASAL ENDOSCOPY DX: CPT

## 2024-07-30 PROCEDURE — 99213 OFFICE O/P EST LOW 20 MIN: CPT | Mod: 25

## 2024-07-30 NOTE — DATA REVIEWED
[de-identified] : CT 12/12/22:\par  FINDINGS:\par  \par  Since prior study this patient is status post bilateral antrostomy and middle turbinectomy. The maxillary outflow is patent. The upper aspect of the nasal septum is deviated to the right. The anterior tip of the nasal septum is deviated to the left with inferior turbinate hypertrophy narrowing the nasal passages.\par  \par  There is a small polyp or retention cyst in the right maxillary sinus. The remainder the maxillary sinuses are clear.\par  \par  The residual ethmoid air cells, frontoethmoidal recesses and frontal sinus are clear.\par  \par  The sphenoid sinus and sphenoethmoidal recesses are clear.\par  \par  There is no posterior pharyngeal mass. The lamina papyracea, eulalia caity and cribriform plate are normal.\par  \par  The osteomeatal units are patent. The nasal septum is midline. The visualized soft tissues of the nasopharynx, orbits and brain are unremarkable. The mastoid air cells are normally aerated.\par  \par  IMPRESSION:\par  \par  1. There is deviation of the upper nasal septum to the right in the anterior tip of the nasal septum to the left with inferior turbinate hypertrophy narrowing the nasal passages.\par  \par  2. Interval bilateral antrostomy and middle turbinectomy with patent maxillary outflow. Small polyp or retention cyst right maxillary sinus. Previous extensive opacification right maxillary sinus is no longer present.\par  \par  3. The remainder the paranasal sinuses and recesses are clear.

## 2024-07-30 NOTE — PHYSICAL EXAM
[Nasal Endoscopy Performed] : nasal endoscopy was performed, see procedure section for findings [Midline] : trachea located in midline position [Normal] : no rashes [de-identified] : eac clear, tm intact, me clear

## 2024-07-30 NOTE — HISTORY OF PRESENT ILLNESS
[de-identified] : 38M here in routine followup.  Around 3 weeks ago he developed worsening facial pressure followed by yellow/green mucus. This persisted and he was given abx (cefuroxime) by PCP which he is currently taking. Since then, his sx have improved slowly, but still very much persist. He also continues to c/o ear fullness as if there is water stuck inside; the right ear is much than the left. He remains on BID budesonide.   He is s/p revision septoplasty/revision ESS (b/l maxillary, frontal, sphenoid and left ethmoid) 5/22/23 for recurrent acute on chronic pansinusitis. Intraoperatively, diffusely osteitic thick bone throughout and right draf2b done. Whereas the first 6-8 months since surgery he was great, he feels there has been a real regression with increased congestion and mucus and now at least 3 separate sinus infections  Tympanometry/ET testing 7/30/24: -Type A tymps -abnormal left ETF, normal right ETF  Audiogram from 5/28/24: -hearing symmetric and wnl -SRT 10/5, 100% -type A  ROS otherwise unremarkable

## 2024-07-30 NOTE — CONSULT LETTER
[Dear  ___] : Dear  [unfilled], [Courtesy Letter:] : I had the pleasure of seeing your patient, [unfilled], in my office today. [Consult Closing:] : Thank you very much for allowing me to participate in the care of this patient.  If you have any questions, please do not hesitate to contact me. [Sincerely,] : Sincerely, [FreeTextEntry3] : Regan Kaiser MD\par  Department of Otolaryngology - Head and Neck Surgery\par  [facility]

## 2024-07-30 NOTE — ASSESSMENT
[FreeTextEntry1] : 38M here in routine followup. Around 3 weeks ago he developed worsening facial pressure followed by yellow/green mucus. This persisted and he was given abx (cefuroxime) by PCP which he is currently taking. Since then, his sx have improved slowly, but still very much persist. He also continues to c/o ear fullness as if there is water stuck inside; the right ear is much than the left. He remains on BID budesonide. Sudafed reliably helps his ears which he takes mostly daily. He is s/p revision septoplasty/revision ESS (b/l maxillary, frontal, sphenoid and left ethmoid) 5/22/23 for recurrent acute on chronic pansinusitis. Intraoperatively, diffusely osteitic thick bone throughout and right draf2b done. Whereas the first 6-8 months since surgery he was great, he feels there has been a real regression with increased congestion and mucus and now at least 3 separate sinus infections. Tympanometry today is normal; ET testing today is normal in the right and abnormal in the left. Audiometry from today 5/2024 is completely normal. On exam, nasal endoscopy shows well healed postoperative changes w widely patent nasal airways and paranasal sinuses; there is layering yellow/white mucus in both maxillary sinuses and edema in the left frontal outflow; ears are normal. For the first time in office, he has a bilateral (acute on chronic) maxillary sinusitis. He has been on 8 days of abx but cx were taken for completeness. Though there is mild left frontal outflow edema, the remainder of paranasal sinuses are clear. Seems like there is a sump effect at play given the fact just the maxillaries are involved; this is also his typical sx - the sinuses are widely patent, but have mucociliary dysfunction. That being said, perhaps he would benefit from adding antimicrobials to the rinse which may help turn this around. Will f/u cx and go from there. Regarding his ear sx, this is all likely due to eustachian tube dysfunction, as sudafed does give relief, and this is worsened by his acute on chronic sinusitis episodes. Audiometric testing has always been normal and there are no adenoids.  As an side, regarding the final pathology of 'superficial fungal elements' in the sinus shavings, it is not unreasonable that he has some form of allergic fungal sinusitis given his history, which places him at the inflammatory extreme of sinus disease.

## 2024-07-30 NOTE — PROCEDURE
[FreeTextEntry3] : Nasal Endoscopy: nasal airways and paranasal sinuses widely patent yellow/white mucoid debris pooling in bilateral maxillary sinuses -> cx taken mild scattered frontoethmoid polypoid edema edema left frontal outflow choana clear

## 2024-08-03 LAB — EAR NOSE AND THROAT CULTURE: ABNORMAL

## 2024-09-05 RX ORDER — MUPIROCIN 2 G/100G
2 CREAM TOPICAL
Qty: 1 | Refills: 5 | Status: ACTIVE | COMMUNITY
Start: 2024-09-05 | End: 1900-01-01

## 2024-09-10 ENCOUNTER — LABORATORY RESULT (OUTPATIENT)
Age: 39
End: 2024-09-10

## 2024-09-11 LAB
DEPRECATED KAPPA LC FREE/LAMBDA SER: 1.47 RATIO
IGA SER QL IEP: 259 MG/DL
IGG SER QL IEP: 1283 MG/DL
IGM SER QL IEP: 193 MG/DL
KAPPA LC CSF-MCNC: 2.06 MG/DL
KAPPA LC SERPL-MCNC: 3.02 MG/DL

## 2024-09-12 LAB
A ALTERNATA IGE QN: <0.1 KUA/L
A FUMIGATUS IGE QN: <0.1 KUA/L
C ALBICANS IGE QN: <0.1 KUA/L
C HERBARUM IGE QN: <0.1 KUA/L
CAT DANDER IGE QN: <0.1 KUA/L
COMMON RAGWEED IGE QN: <0.1 KUA/L
D FARINAE IGE QN: <0.1 KUA/L
D PTERONYSS IGE QN: <0.1 KUA/L
DEPRECATED A ALTERNATA IGE RAST QL: 0 (ref 0–?)
DEPRECATED A FUMIGATUS IGE RAST QL: 0 (ref 0–?)
DEPRECATED C ALBICANS IGE RAST QL: 0
DEPRECATED C HERBARUM IGE RAST QL: 0 (ref 0–?)
DEPRECATED CAT DANDER IGE RAST QL: 0 (ref 0–?)
DEPRECATED COMMON RAGWEED IGE RAST QL: 0 (ref 0–?)
DEPRECATED D FARINAE IGE RAST QL: 0 (ref 0–?)
DEPRECATED D PTERONYSS IGE RAST QL: 0 (ref 0–?)
DEPRECATED DOG DANDER IGE RAST QL: 0 (ref 0–?)
DEPRECATED M RACEMOSUS IGE RAST QL: 0
DEPRECATED ROACH IGE RAST QL: 0 (ref 0–?)
DEPRECATED TIMOTHY IGE RAST QL: 2 (ref 0–?)
DEPRECATED WHITE OAK IGE RAST QL: 0 (ref 0–?)
DOG DANDER IGE QN: <0.1 KUA/L
M RACEMOSUS IGE QN: <0.1 KUA/L
ROACH IGE QN: <0.1 KUA/L
TIMOTHY IGE QN: 0.77 KUA/L
WHITE OAK IGE QN: <0.1 KUA/L

## 2025-01-07 ENCOUNTER — NON-APPOINTMENT (OUTPATIENT)
Age: 40
End: 2025-01-07

## 2025-01-07 ENCOUNTER — APPOINTMENT (OUTPATIENT)
Dept: OTOLARYNGOLOGY | Facility: CLINIC | Age: 40
End: 2025-01-07
Payer: COMMERCIAL

## 2025-01-07 VITALS — BODY MASS INDEX: 26.95 KG/M2 | HEIGHT: 74 IN | WEIGHT: 210 LBS

## 2025-01-07 DIAGNOSIS — J32.4 CHRONIC PANSINUSITIS: ICD-10-CM

## 2025-01-07 DIAGNOSIS — H93.8X1 OTHER SPECIFIED DISORDERS OF RIGHT EAR: ICD-10-CM

## 2025-01-07 DIAGNOSIS — H93.8X3 OTHER SPECIFIED DISORDERS OF EAR, BILATERAL: ICD-10-CM

## 2025-01-07 PROCEDURE — 99214 OFFICE O/P EST MOD 30 MIN: CPT | Mod: 25

## 2025-01-07 PROCEDURE — 31231 NASAL ENDOSCOPY DX: CPT

## 2025-01-07 PROCEDURE — 99213 OFFICE O/P EST LOW 20 MIN: CPT | Mod: 25

## 2025-01-13 LAB — EAR NOSE AND THROAT CULTURE: NORMAL

## 2025-02-13 ENCOUNTER — APPOINTMENT (OUTPATIENT)
Dept: CT IMAGING | Facility: CLINIC | Age: 40
End: 2025-02-13

## 2025-02-25 ENCOUNTER — APPOINTMENT (OUTPATIENT)
Dept: CT IMAGING | Facility: CLINIC | Age: 40
End: 2025-02-25
Payer: COMMERCIAL

## 2025-02-25 ENCOUNTER — OUTPATIENT (OUTPATIENT)
Dept: OUTPATIENT SERVICES | Facility: HOSPITAL | Age: 40
LOS: 1 days | End: 2025-02-25

## 2025-02-25 DIAGNOSIS — Z87.81 PERSONAL HISTORY OF (HEALED) TRAUMATIC FRACTURE: Chronic | ICD-10-CM

## 2025-02-25 DIAGNOSIS — Z90.89 ACQUIRED ABSENCE OF OTHER ORGANS: Chronic | ICD-10-CM

## 2025-02-25 DIAGNOSIS — Z98.890 OTHER SPECIFIED POSTPROCEDURAL STATES: Chronic | ICD-10-CM

## 2025-02-25 PROCEDURE — 70486 CT MAXILLOFACIAL W/O DYE: CPT | Mod: 26

## 2025-04-29 ENCOUNTER — APPOINTMENT (OUTPATIENT)
Dept: OTOLARYNGOLOGY | Facility: CLINIC | Age: 40
End: 2025-04-29

## 2025-04-29 ENCOUNTER — NON-APPOINTMENT (OUTPATIENT)
Age: 40
End: 2025-04-29

## (undated) DEVICE — GLV 7 PROTEXIS (WHITE)

## (undated) DEVICE — DRSG MEROCEL SPLINT SILICONE

## (undated) DEVICE — Device

## (undated) DEVICE — MEDTRONIC AXIEM PATIENT TRACKER NON-INVASIVE

## (undated) DEVICE — BLADE MEDTRONIC ENT RAD 90 DEGREE ROTATABLE 3.5MM X 11CM

## (undated) DEVICE — WARMING BLANKET LOWER ADULT

## (undated) DEVICE — BUR MEDTRONIC ENT TAPERED DIAMOND CHOANAL ATRESIA 15 DEGREE 4MM X 13CM

## (undated) DEVICE — BUR MEDTRONIC ENT HIGH SPEED TAPERED DIAMOND 70 DEGREE 4.0MM X 13CM

## (undated) DEVICE — BLADE MEDTRONIC ENT RAD 60 DEGREE ROTATABLE 4MM X 11CM

## (undated) DEVICE — ELCTR BOVIE PENCIL BLADE 10FT

## (undated) DEVICE — SLV COMPRESSION KNEE MED

## (undated) DEVICE — DRSG GAUZE SPONGE 2X2" STERILE

## (undated) DEVICE — BLADE MEDTRONIC ENT INFERIOR TURBINATE ROTATABLE STRAIGHT 2.9MM X 11CM

## (undated) DEVICE — DRAPE MAYO STAND 23"

## (undated) DEVICE — SUT CHROMIC 4-0 18" G-3

## (undated) DEVICE — SUCTION CATH ARGYLE WHISTLE TIP 14FR STRAIGHT PACKED

## (undated) DEVICE — SUT PLAIN GUT 4-0 18" SC-1

## (undated) DEVICE — STAPLER SKIN VISI-STAT 35 WIDE

## (undated) DEVICE — ELCTR BOVIE SUCTION 8FR 6"

## (undated) DEVICE — BLADE MEDTRONIC ENT FUSION QUADCUT ROTATABLE STRAIGHT 4.3MM X 13CM

## (undated) DEVICE — DRSG TELFA 3 X 8

## (undated) DEVICE — SYR LUER LOK 50CC

## (undated) DEVICE — PETRI DISH MED 3.5"

## (undated) DEVICE — MEDTRONIC INSTRUMENT TRACKER ENT

## (undated) DEVICE — PACK RHINOPLASTY

## (undated) DEVICE — SUT PROLENE 4-0 36" RB-1

## (undated) DEVICE — SUT PROLENE 2-0 30" CT-2

## (undated) DEVICE — DRSG STERISTRIPS 0.5 X 4"

## (undated) DEVICE — SOL ANTI FOG

## (undated) DEVICE — MARKING PEN W RULER

## (undated) DEVICE — SYR ASEPTO

## (undated) DEVICE — ACCLARENT SET INFLATION DEVICE